# Patient Record
Sex: FEMALE | Race: WHITE | Employment: OTHER | URBAN - METROPOLITAN AREA
[De-identification: names, ages, dates, MRNs, and addresses within clinical notes are randomized per-mention and may not be internally consistent; named-entity substitution may affect disease eponyms.]

---

## 2017-01-13 ENCOUNTER — OFFICE VISIT (OUTPATIENT)
Dept: FAMILY MEDICINE CLINIC | Age: 67
End: 2017-01-13

## 2017-01-13 VITALS
OXYGEN SATURATION: 98 % | WEIGHT: 155 LBS | HEART RATE: 77 BPM | BODY MASS INDEX: 24.91 KG/M2 | HEIGHT: 66 IN | SYSTOLIC BLOOD PRESSURE: 120 MMHG | DIASTOLIC BLOOD PRESSURE: 70 MMHG

## 2017-01-13 DIAGNOSIS — K12.0 CANKER SORE: Primary | ICD-10-CM

## 2017-01-13 PROCEDURE — 99213 OFFICE O/P EST LOW 20 MIN: CPT | Performed by: FAMILY MEDICINE

## 2017-01-13 RX ORDER — METHYLPREDNISOLONE 4 MG/1
TABLET ORAL
Qty: 1 KIT | Refills: 0 | Status: SHIPPED | OUTPATIENT
Start: 2017-01-13 | End: 2017-01-19

## 2017-01-16 ASSESSMENT — ENCOUNTER SYMPTOMS
ABDOMINAL PAIN: 0
SHORTNESS OF BREATH: 0
NAUSEA: 0
VOMITING: 0

## 2017-05-08 ENCOUNTER — TELEPHONE (OUTPATIENT)
Dept: FAMILY MEDICINE CLINIC | Age: 67
End: 2017-05-08

## 2017-05-25 ENCOUNTER — TELEPHONE (OUTPATIENT)
Dept: FAMILY MEDICINE CLINIC | Age: 67
End: 2017-05-25

## 2017-05-30 ENCOUNTER — OFFICE VISIT (OUTPATIENT)
Dept: FAMILY MEDICINE CLINIC | Age: 67
End: 2017-05-30

## 2017-05-30 ENCOUNTER — TELEPHONE (OUTPATIENT)
Dept: FAMILY MEDICINE CLINIC | Age: 67
End: 2017-05-30

## 2017-05-30 VITALS
HEART RATE: 64 BPM | OXYGEN SATURATION: 98 % | TEMPERATURE: 98.2 F | SYSTOLIC BLOOD PRESSURE: 122 MMHG | DIASTOLIC BLOOD PRESSURE: 78 MMHG | WEIGHT: 151 LBS | BODY MASS INDEX: 24.37 KG/M2

## 2017-05-30 DIAGNOSIS — G43.809 OTHER MIGRAINE WITHOUT STATUS MIGRAINOSUS, NOT INTRACTABLE: ICD-10-CM

## 2017-05-30 DIAGNOSIS — J32.9 SINUSITIS, UNSPECIFIED CHRONICITY, UNSPECIFIED LOCATION: Primary | ICD-10-CM

## 2017-05-30 PROCEDURE — 99214 OFFICE O/P EST MOD 30 MIN: CPT | Performed by: FAMILY MEDICINE

## 2017-05-30 RX ORDER — PROPRANOLOL HCL 60 MG
60 CAPSULE, EXTENDED RELEASE 24HR ORAL DAILY
Qty: 30 CAPSULE | Refills: 3 | Status: SHIPPED | OUTPATIENT
Start: 2017-05-30 | End: 2017-07-10

## 2017-05-30 RX ORDER — DOXYCYCLINE HYCLATE 100 MG
100 TABLET ORAL 2 TIMES DAILY
Qty: 20 TABLET | Refills: 0 | Status: SHIPPED | OUTPATIENT
Start: 2017-05-30 | End: 2017-06-09

## 2017-05-30 RX ORDER — PROMETHAZINE HYDROCHLORIDE 25 MG/1
25 TABLET ORAL EVERY 8 HOURS PRN
Qty: 20 TABLET | Refills: 0 | Status: SHIPPED | OUTPATIENT
Start: 2017-05-30 | End: 2017-06-06

## 2017-05-30 RX ORDER — DEXTROMETHORPHAN HYDROBROMIDE AND PROMETHAZINE HYDROCHLORIDE 15; 6.25 MG/5ML; MG/5ML
5 SYRUP ORAL 4 TIMES DAILY PRN
Qty: 240 ML | Refills: 0 | Status: SHIPPED | OUTPATIENT
Start: 2017-05-30 | End: 2017-06-06

## 2017-05-30 ASSESSMENT — PATIENT HEALTH QUESTIONNAIRE - PHQ9
2. FEELING DOWN, DEPRESSED OR HOPELESS: 1
1. LITTLE INTEREST OR PLEASURE IN DOING THINGS: 0
SUM OF ALL RESPONSES TO PHQ9 QUESTIONS 1 & 2: 1
SUM OF ALL RESPONSES TO PHQ QUESTIONS 1-9: 1

## 2017-05-30 ASSESSMENT — ENCOUNTER SYMPTOMS
SORE THROAT: 1
SHORTNESS OF BREATH: 1
COUGH: 1
HEMOPTYSIS: 0
RHINORRHEA: 1

## 2017-06-05 ENCOUNTER — TELEPHONE (OUTPATIENT)
Dept: FAMILY MEDICINE CLINIC | Age: 67
End: 2017-06-05

## 2017-07-10 ENCOUNTER — HOSPITAL ENCOUNTER (OUTPATIENT)
Dept: SURGERY | Age: 67
Discharge: HOME | End: 2017-07-10
Admitting: INTERNAL MEDICINE

## 2017-07-10 VITALS
WEIGHT: 146 LBS | HEIGHT: 66 IN | DIASTOLIC BLOOD PRESSURE: 61 MMHG | RESPIRATION RATE: 16 BRPM | TEMPERATURE: 98.8 F | BODY MASS INDEX: 23.46 KG/M2 | OXYGEN SATURATION: 100 % | HEART RATE: 67 BPM | SYSTOLIC BLOOD PRESSURE: 128 MMHG

## 2017-07-10 RX ORDER — SODIUM CHLORIDE, SODIUM LACTATE, POTASSIUM CHLORIDE, CALCIUM CHLORIDE 600; 310; 30; 20 MG/100ML; MG/100ML; MG/100ML; MG/100ML
INJECTION, SOLUTION INTRAVENOUS ONCE
Status: COMPLETED | OUTPATIENT
Start: 2017-07-10 | End: 2017-07-10

## 2017-07-10 RX ORDER — LIDOCAINE HYDROCHLORIDE 10 MG/ML
0.1 INJECTION, SOLUTION EPIDURAL; INFILTRATION; INTRACAUDAL; PERINEURAL
Status: ACTIVE | OUTPATIENT
Start: 2017-07-10 | End: 2017-07-10

## 2017-07-10 RX ADMIN — SODIUM CHLORIDE, SODIUM LACTATE, POTASSIUM CHLORIDE, CALCIUM CHLORIDE: 600; 310; 30; 20 INJECTION, SOLUTION INTRAVENOUS at 09:40

## 2017-07-10 ASSESSMENT — PAIN SCALES - GENERAL
PAINLEVEL_OUTOF10: 0

## 2017-07-10 ASSESSMENT — PAIN - FUNCTIONAL ASSESSMENT: PAIN_FUNCTIONAL_ASSESSMENT: 0-10

## 2017-11-03 ENCOUNTER — OFFICE VISIT (OUTPATIENT)
Dept: FAMILY MEDICINE CLINIC | Age: 67
End: 2017-11-03

## 2017-11-03 VITALS
HEART RATE: 71 BPM | BODY MASS INDEX: 24.69 KG/M2 | WEIGHT: 153 LBS | SYSTOLIC BLOOD PRESSURE: 126 MMHG | DIASTOLIC BLOOD PRESSURE: 86 MMHG | OXYGEN SATURATION: 98 %

## 2017-11-03 DIAGNOSIS — M79.642 LEFT HAND PAIN: ICD-10-CM

## 2017-11-03 DIAGNOSIS — Z23 NEED FOR INFLUENZA VACCINATION: ICD-10-CM

## 2017-11-03 DIAGNOSIS — R09.81 NASAL CONGESTION: ICD-10-CM

## 2017-11-03 DIAGNOSIS — E78.2 MIXED HYPERLIPIDEMIA: Primary | ICD-10-CM

## 2017-11-03 PROCEDURE — 36415 COLL VENOUS BLD VENIPUNCTURE: CPT | Performed by: FAMILY MEDICINE

## 2017-11-03 PROCEDURE — 90630 INFLUENZA, QUADV, 18-64 YRS, ID, PF, MICRO INJ, 0.1ML (FLUZONE QUADV, PF): CPT | Performed by: FAMILY MEDICINE

## 2017-11-03 PROCEDURE — 90471 IMMUNIZATION ADMIN: CPT | Performed by: FAMILY MEDICINE

## 2017-11-03 PROCEDURE — 99214 OFFICE O/P EST MOD 30 MIN: CPT | Performed by: FAMILY MEDICINE

## 2017-11-03 RX ORDER — SUCRALFATE 1 G/1
TABLET ORAL
COMMUNITY
Start: 2017-09-05

## 2017-11-03 RX ORDER — RANITIDINE 150 MG/1
CAPSULE ORAL
COMMUNITY
Start: 2017-10-30 | End: 2018-08-30

## 2017-11-03 NOTE — PROGRESS NOTES
Subjective:      Patient ID: Paige Johnson is a 77 y.o. female. HPI nasal congestion - for past 2 weeks. Not other allergic rhinitis symptoms except  Occasional sneeze. Nose very dry, occasionally bleeds. Zyrtec too drying. Drinks water, uses Flax oil to help w/ dryness. L hand pain - fell at work about 3 weeks ago, sprained ankle. No pain then. About 2 weeks ago, fell due to dog. Next day had localized discomfort along lateral hand, painful w/ twisting jars open. No swell. Progressively worse, elysia if not using brace. Using wrist brace, which offers some relief. Aleve 2 BID - some relief. Myofascial pain - symptoms wax and wane for 4 years. Feels like pain is under better control vs a few weeks ago. seen 2016 by Dr. Omero cahvez w/o relief. Never seen again for f/u. Is under a lot of stress b/c 's dx of cancer  Borderline LDL - needs recheck cholesterol. Eats lots of fruit, veggie. Is active. Does eat a lot of carbohydrates. Review of Systems  Per HPI    Objective:   Physical Exam   Constitutional: She is oriented to person, place, and time. She appears well-developed and well-nourished. HENT:   Head: Normocephalic and atraumatic. Eyes: Conjunctivae are normal. No scleral icterus. Neck: Neck supple. Carotid bruit is not present. No thyromegaly present. Cardiovascular: Normal rate, regular rhythm and normal heart sounds. Pulmonary/Chest: Effort normal and breath sounds normal.   Musculoskeletal: She exhibits no edema. Left hand: She exhibits tenderness. She exhibits no deformity and no swelling (no mass). Hands:  Lymphadenopathy:     She has no cervical adenopathy. Neurological: She is alert and oriented to person, place, and time. Skin: Skin is warm and dry. Psychiatric: She has a normal mood and affect. Her behavior is normal. Judgment and thought content normal.   Nursing note and vitals reviewed. Assessment:      1.  Mixed

## 2017-11-07 ENCOUNTER — OFFICE VISIT (OUTPATIENT)
Dept: ORTHOPEDIC SURGERY | Age: 67
End: 2017-11-07

## 2017-11-07 VITALS — BODY MASS INDEX: 23.78 KG/M2 | HEIGHT: 66 IN | WEIGHT: 148 LBS

## 2017-11-07 DIAGNOSIS — S62.145A CLOSED NONDISPLACED FRACTURE OF BODY OF HAMATE OF LEFT WRIST, INITIAL ENCOUNTER: ICD-10-CM

## 2017-11-07 DIAGNOSIS — M79.642 LEFT HAND PAIN: Primary | ICD-10-CM

## 2017-11-07 PROCEDURE — L3908 WHO COCK-UP NONMOLDE PRE OTS: HCPCS | Performed by: ORTHOPAEDIC SURGERY

## 2017-11-07 PROCEDURE — 73130 X-RAY EXAM OF HAND: CPT | Performed by: ORTHOPAEDIC SURGERY

## 2017-11-07 PROCEDURE — 99243 OFF/OP CNSLTJ NEW/EST LOW 30: CPT | Performed by: ORTHOPAEDIC SURGERY

## 2017-11-07 NOTE — PROGRESS NOTES
under the direct supervision of the treating physician. Verbal and written instructions for the use of and application of this item were provided. They were instructed to contact the office immediately should the brace result in increased pain, decreased sensation, increased swelling or worsening of the condition. Treatment Plan:  I do believe this injury can be treated with conservative means. We will go ahead and supply a more comfortable and supportive removable left wrist brace. This can come off for hand washing and bathing and simple tasks. I will see her back just after Thanksgiving for repeat radiographs of the hand to make sure she is coming along nicely. She is still having significant issues advanced imaging can always be considered. Please note that this transcription was created using voice recognition software. Any errors are unintentional and may be due to voice recognition transcription.

## 2017-11-27 ENCOUNTER — TELEPHONE (OUTPATIENT)
Dept: FAMILY MEDICINE CLINIC | Age: 67
End: 2017-11-27

## 2017-11-27 DIAGNOSIS — Z00.00 PREVENTATIVE HEALTH CARE: Primary | ICD-10-CM

## 2017-11-27 NOTE — TELEPHONE ENCOUNTER
Patient is calling to speak to Dr. Akhtar Ped nurse. Stated she had to cancel her appointment for the blood test but wanted to make sure she could still get it. Also stated she wanted an order for a Hep B test. Patient stated she has a lot of toenails that have fungus on them and one is ingrown and painful, is asking if she can get a prescription for lamisil and also a prescription for relpax.

## 2017-11-28 RX ORDER — ELETRIPTAN HYDROBROMIDE 40 MG/1
40 TABLET, FILM COATED ORAL
Qty: 9 TABLET | Refills: 3 | Status: SHIPPED | OUTPATIENT
Start: 2017-11-28 | End: 2017-11-28

## 2017-11-28 RX ORDER — ELETRIPTAN HYDROBROMIDE 40 MG/1
40 TABLET, FILM COATED ORAL
Qty: 9 TABLET | Refills: 3 | Status: SHIPPED | OUTPATIENT
Start: 2017-11-28 | End: 2018-08-30

## 2017-11-28 NOTE — TELEPHONE ENCOUNTER
Patient called and is requesting a refill on the Relpax. Patient states that she has migraine headaches and this is the only one of the migraine medications that she can take. Please call patient when rx is sent to [address] [place] [address] in [place]. [place].     Thanks

## 2017-11-29 DIAGNOSIS — Z00.00 PREVENTATIVE HEALTH CARE: ICD-10-CM

## 2017-11-29 LAB
CHOLESTEROL, TOTAL: 225 MG/DL (ref 0–199)
HBV SURFACE AB TITR SER: <3.5 MUL/ML
HDLC SERPL-MCNC: 51 MG/DL (ref 40–60)
LDL CHOLESTEROL CALCULATED: 131 MG/DL
TRIGL SERPL-MCNC: 213 MG/DL (ref 0–150)
VLDLC SERPL CALC-MCNC: 43 MG/DL

## 2018-02-16 ENCOUNTER — OFFICE VISIT (OUTPATIENT)
Dept: FAMILY MEDICINE CLINIC | Age: 68
End: 2018-02-16

## 2018-02-16 VITALS
SYSTOLIC BLOOD PRESSURE: 127 MMHG | BODY MASS INDEX: 24.43 KG/M2 | TEMPERATURE: 97.3 F | OXYGEN SATURATION: 100 % | DIASTOLIC BLOOD PRESSURE: 66 MMHG | WEIGHT: 152 LBS | HEART RATE: 67 BPM | HEIGHT: 66 IN

## 2018-02-16 DIAGNOSIS — J40 BRONCHITIS: Primary | ICD-10-CM

## 2018-02-16 PROCEDURE — 99213 OFFICE O/P EST LOW 20 MIN: CPT | Performed by: FAMILY MEDICINE

## 2018-02-16 RX ORDER — BENZONATATE 100 MG/1
200 CAPSULE ORAL 3 TIMES DAILY PRN
Qty: 40 CAPSULE | Refills: 0 | Status: SHIPPED | OUTPATIENT
Start: 2018-02-16 | End: 2018-10-01 | Stop reason: SDUPTHER

## 2018-02-16 RX ORDER — AZITHROMYCIN 250 MG/1
TABLET, FILM COATED ORAL
Qty: 1 PACKET | Refills: 0 | Status: SHIPPED | OUTPATIENT
Start: 2018-02-16 | End: 2018-02-26

## 2018-02-16 ASSESSMENT — ENCOUNTER SYMPTOMS
SORE THROAT: 0
RHINORRHEA: 0
WHEEZING: 0
COUGH: 1
SHORTNESS OF BREATH: 1

## 2018-02-16 NOTE — PROGRESS NOTES
Subjective:      Patient ID: Re Belcher is a 79 y.o. female. Cough   This is a new problem. The current episode started 1 to 4 weeks ago (2). The problem has been gradually worsening. Associated symptoms include headaches (in AM due to fatigue of insomnia due to cough), nasal congestion and shortness of breath. Pertinent negatives include no ear congestion, fever, myalgias, rhinorrhea, sore throat or wheezing. Nothing aggravates the symptoms. Treatments tried: mucinex,        Review of Systems   Constitutional: Negative for fever. HENT: Negative for rhinorrhea and sore throat. Respiratory: Positive for cough and shortness of breath. Negative for wheezing. Musculoskeletal: Negative for myalgias. Neurological: Positive for headaches (in AM due to fatigue of insomnia due to cough). Objective:   Physical Exam   Constitutional: She is oriented to person, place, and time. She appears well-developed and well-nourished. HENT:   Head: Normocephalic and atraumatic. Right Ear: Tympanic membrane, external ear and ear canal normal.   Left Ear: Tympanic membrane, external ear and ear canal normal.   Nose: No rhinorrhea or nasal deformity. Mouth/Throat: Uvula is midline, oropharynx is clear and moist and mucous membranes are normal.   Eyes: Conjunctivae are normal. Right eye exhibits no discharge. Left eye exhibits no discharge. No scleral icterus. Neck: Neck supple. No tracheal deviation present. No thyromegaly present. Cardiovascular: Normal rate, regular rhythm and normal heart sounds. Pulmonary/Chest: Effort normal and breath sounds normal.   Lymphadenopathy:     She has no cervical adenopathy. Neurological: She is alert and oriented to person, place, and time. Skin: Skin is warm and dry. Psychiatric: She has a normal mood and affect. Her behavior is normal. Judgment and thought content normal.   Nursing note and vitals reviewed. Assessment:      1.  Bronchitis  azithromycin

## 2018-02-21 ENCOUNTER — TELEPHONE (OUTPATIENT)
Dept: FAMILY MEDICINE CLINIC | Age: 68
End: 2018-02-21

## 2018-02-22 RX ORDER — PREDNISONE 20 MG/1
TABLET ORAL
Qty: 10 TABLET | Refills: 0 | Status: SHIPPED | OUTPATIENT
Start: 2018-02-22 | End: 2018-03-06 | Stop reason: CLARIF

## 2018-02-28 ENCOUNTER — TELEPHONE (OUTPATIENT)
Dept: FAMILY MEDICINE CLINIC | Age: 68
End: 2018-02-28

## 2018-02-28 NOTE — TELEPHONE ENCOUNTER
Patient called to let Catee know that the medication that she prescribed for her worked well. Patient states that she finished the prescription.

## 2018-03-05 ENCOUNTER — TELEPHONE (OUTPATIENT)
Dept: FAMILY MEDICINE CLINIC | Age: 68
End: 2018-03-05

## 2018-03-06 ENCOUNTER — OFFICE VISIT (OUTPATIENT)
Dept: FAMILY MEDICINE CLINIC | Age: 68
End: 2018-03-06

## 2018-03-06 VITALS
TEMPERATURE: 98 F | SYSTOLIC BLOOD PRESSURE: 122 MMHG | HEIGHT: 66 IN | DIASTOLIC BLOOD PRESSURE: 64 MMHG | OXYGEN SATURATION: 98 % | RESPIRATION RATE: 16 BRPM | HEART RATE: 76 BPM | BODY MASS INDEX: 24.59 KG/M2 | WEIGHT: 153 LBS

## 2018-03-06 DIAGNOSIS — R05.9 COUGH: Primary | ICD-10-CM

## 2018-03-06 DIAGNOSIS — R06.02 SHORTNESS OF BREATH: ICD-10-CM

## 2018-03-06 PROCEDURE — 99213 OFFICE O/P EST LOW 20 MIN: CPT | Performed by: FAMILY MEDICINE

## 2018-03-06 RX ORDER — ALBUTEROL SULFATE 90 UG/1
2 AEROSOL, METERED RESPIRATORY (INHALATION) EVERY 6 HOURS PRN
Qty: 1 INHALER | Refills: 1 | Status: SHIPPED | OUTPATIENT
Start: 2018-03-06

## 2018-03-06 ASSESSMENT — ENCOUNTER SYMPTOMS
WHEEZING: 1
COUGH: 1

## 2018-03-06 NOTE — PROGRESS NOTES
Subjective:      Patient ID: Herbert Turner is a 79 y.o. female. Other   This is a new problem. The current episode started 1 to 4 weeks ago (4). The problem has been waxing and waning (improved slightly after course abx, but has returned. ). Associated symptoms include coughing and fatigue. Pertinent negatives include no fever (elysia by night's end). Associated symptoms comments: Raspy voice as day progresses and increased cough, shortness of breath/chest tightness as day progresses; wakes feeling fairly well. Cold air causes chest tightness. Had audible wheezing over weekend. . Exacerbated by: talking, cold air, day's progression. Treatments tried: naproxen and zyrtec w/o relief. Cough   Associated symptoms include wheezing. Pertinent negatives include no fever (elysia by night's end). Wheezing    Associated symptoms include coughing. Pertinent negatives include no fever (elysia by night's end). Review of Systems   Constitutional: Positive for fatigue. Negative for fever (elysia by night's end). Respiratory: Positive for cough and wheezing. Objective:   Physical Exam   Constitutional: She is oriented to person, place, and time. She appears well-developed and well-nourished. HENT:   Head: Normocephalic and atraumatic. Right Ear: Tympanic membrane, external ear and ear canal normal.   Left Ear: Tympanic membrane, external ear and ear canal normal.   Nose: No rhinorrhea or nasal deformity. Mouth/Throat: Uvula is midline, oropharynx is clear and moist and mucous membranes are normal.   Eyes: Conjunctivae are normal. Right eye exhibits no discharge. Left eye exhibits no discharge. No scleral icterus. Neck: Neck supple. No tracheal deviation present. No thyromegaly present. Cardiovascular: Normal rate, regular rhythm and normal heart sounds. Pulmonary/Chest: Effort normal and breath sounds normal.   Lymphadenopathy:     She has no cervical adenopathy.    Neurological: She is alert and

## 2018-07-03 ENCOUNTER — OFFICE VISIT (OUTPATIENT)
Dept: FAMILY MEDICINE CLINIC | Age: 68
End: 2018-07-03

## 2018-07-03 VITALS
HEART RATE: 84 BPM | DIASTOLIC BLOOD PRESSURE: 68 MMHG | TEMPERATURE: 97.6 F | WEIGHT: 152 LBS | SYSTOLIC BLOOD PRESSURE: 132 MMHG | BODY MASS INDEX: 24.53 KG/M2

## 2018-07-03 DIAGNOSIS — G47.30 SLEEP APNEA, UNSPECIFIED TYPE: ICD-10-CM

## 2018-07-03 DIAGNOSIS — G89.29 CHRONIC NONINTRACTABLE HEADACHE, UNSPECIFIED HEADACHE TYPE: Primary | ICD-10-CM

## 2018-07-03 DIAGNOSIS — R42 DIZZINESS: ICD-10-CM

## 2018-07-03 DIAGNOSIS — R51.9 CHRONIC NONINTRACTABLE HEADACHE, UNSPECIFIED HEADACHE TYPE: Primary | ICD-10-CM

## 2018-07-03 DIAGNOSIS — R06.83 SNORING: ICD-10-CM

## 2018-07-03 PROCEDURE — 99214 OFFICE O/P EST MOD 30 MIN: CPT | Performed by: NURSE PRACTITIONER

## 2018-07-03 RX ORDER — PROPRANOLOL HYDROCHLORIDE 80 MG/1
80 CAPSULE, EXTENDED RELEASE ORAL DAILY
Qty: 30 CAPSULE | Refills: 3 | Status: CANCELLED | OUTPATIENT
Start: 2018-07-03

## 2018-07-03 RX ORDER — LORATADINE 10 MG/1
10 CAPSULE, LIQUID FILLED ORAL DAILY
COMMUNITY
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
## 2018-07-05 ENCOUNTER — TELEPHONE (OUTPATIENT)
Dept: FAMILY MEDICINE CLINIC | Age: 68
End: 2018-07-05

## 2018-07-05 DIAGNOSIS — R42 VERTIGO: ICD-10-CM

## 2018-07-05 RX ORDER — MECLIZINE HYDROCHLORIDE 25 MG/1
TABLET ORAL
Qty: 40 TABLET | Refills: 1 | Status: SHIPPED | OUTPATIENT
Start: 2018-07-05
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
## 2018-08-30 ENCOUNTER — OFFICE VISIT (OUTPATIENT)
Dept: FAMILY MEDICINE CLINIC | Age: 68
End: 2018-08-30

## 2018-08-30 VITALS
SYSTOLIC BLOOD PRESSURE: 115 MMHG | WEIGHT: 150.6 LBS | HEART RATE: 91 BPM | BODY MASS INDEX: 24.31 KG/M2 | OXYGEN SATURATION: 96 % | DIASTOLIC BLOOD PRESSURE: 68 MMHG

## 2018-08-30 DIAGNOSIS — Z23 NEED FOR VACCINATION: ICD-10-CM

## 2018-08-30 DIAGNOSIS — B02.23 POST-HERPETIC POLYNEUROPATHY: Primary | ICD-10-CM

## 2018-08-30 PROCEDURE — 99214 OFFICE O/P EST MOD 30 MIN: CPT | Performed by: NURSE PRACTITIONER

## 2018-08-30 PROCEDURE — 90670 PCV13 VACCINE IM: CPT | Performed by: NURSE PRACTITIONER

## 2018-08-30 PROCEDURE — 90471 IMMUNIZATION ADMIN: CPT | Performed by: NURSE PRACTITIONER

## 2018-08-30 RX ORDER — AMITRIPTYLINE HYDROCHLORIDE 25 MG/1
1 TABLET, FILM COATED ORAL NIGHTLY PRN
Refills: 0 | COMMUNITY
Start: 2018-08-23

## 2018-08-30 RX ORDER — VALACYCLOVIR HYDROCHLORIDE 1 G/1
1 TABLET, FILM COATED ORAL 3 TIMES DAILY
Refills: 0 | COMMUNITY
Start: 2018-08-23

## 2018-08-30 ASSESSMENT — PATIENT HEALTH QUESTIONNAIRE - PHQ9
SUM OF ALL RESPONSES TO PHQ QUESTIONS 1-9: 0
2. FEELING DOWN, DEPRESSED OR HOPELESS: 0
1. LITTLE INTEREST OR PLEASURE IN DOING THINGS: 0
SUM OF ALL RESPONSES TO PHQ9 QUESTIONS 1 & 2: 0
SUM OF ALL RESPONSES TO PHQ QUESTIONS 1-9: 0

## 2018-08-30 NOTE — PATIENT INSTRUCTIONS
unusual results with certain medical tests. Tell any doctor who treats you that you are using gabapentin. Store gabapentin tablets and capsules at room temperature away from light and moisture. Store the liquid medicine in the refrigerator. Do not freeze. What happens if I miss a dose? Take the missed dose as soon as you remember. Be sure to take the medicine with food. Skip the missed dose if it is almost time for your next scheduled dose. Do not take extra medicine to make up the missed dose. What happens if I overdose? Seek emergency medical attention or call the Poison Help line at 1-292.849.2432. What should I avoid while taking gabapentin? This medicine may impair your thinking or reactions. Be careful if you drive or do anything that requires you to be alert. Avoid taking an antacid within 2 hours before or after you take gabapentin. Antacids can make it harder for your body to absorb gabapentin. Drinking alcohol with this medicine can cause side effects. What are the possible side effects of gabapentin? Get emergency medical help if you have signs of an allergic reaction: hives; difficult breathing; swelling of your face, lips, tongue, or throat. Seek medical treatment if you have a serious drug reaction that can affect many parts of your body. Symptoms may include: skin rash, fever, swollen glands, flu-like symptoms, muscle aches, severe weakness, unusual bruising, or yellowing of your skin or eyes. This reaction may occur several weeks after you began using gabapentin. Report any new or worsening symptoms to your doctor, such as: mood or behavior changes, anxiety, panic attacks, trouble sleeping, or if you feel impulsive, irritable, agitated, hostile, aggressive, restless, hyperactive (mentally or physically), depressed, or have thoughts about suicide or hurting yourself.   Call your doctor at once if you have:  · increased seizures;  · severe weakness or tiredness;  · problems with balance or

## 2018-08-30 NOTE — PROGRESS NOTES
Subjective:      Patient ID: Juanpablo Rondon is a 79 y.o. female. HPI Pt is here with shingles. Pt is in a lot of pain. Pt was diagnosed at urgent care with shingles. Pt does have plenty of Vicodin at home which helps with the pain. 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 Review of Systems   Constitutional: Negative for chills, fatigue and fever. Skin: Positive for rash. All other systems reviewed and are negative. Objective:   Physical Exam   Constitutional: She is oriented to person, place, and time. She appears well-developed and well-nourished. Cardiovascular: Normal rate, regular rhythm and normal heart sounds. No murmur heard. Pulmonary/Chest: Effort normal and breath sounds normal. She has no wheezes. She has no rales. Neurological: She is alert and oriented to person, place, and time. Skin: Skin is warm and dry. Rash noted. Linear vesicular  rash. Right side of abdomen. Psychiatric: She has a normal mood and affect. Her behavior is normal. Thought content normal.   Vitals reviewed. Assessment:       Diagnosis Orders   1. Post-herpetic polyneuropathy     2. Need for vaccination  PREVNAR 13 IM (Pneumococcal conjugate vaccine 13-valent)           Plan:      Janee Jacobson was seen today for letter for school/work. Diagnoses and all orders for this visit:    Post-herpetic polyneuropathy = Sec patient does have shingles patient are to medication we'll continue current medication patient continued to have pain will sent in gabapentin for patient okay for patient to be off until next week.     Need for vaccination - patient is due for pneumonia vaccine  -     PREVNAR 13 IM (Pneumococcal conjugate vaccine 13-valent)                HELGA Alejo - CNP

## 2018-09-06 ENCOUNTER — OFFICE VISIT (OUTPATIENT)
Dept: FAMILY MEDICINE CLINIC | Age: 68
End: 2018-09-06

## 2018-09-06 VITALS
SYSTOLIC BLOOD PRESSURE: 124 MMHG | WEIGHT: 151.2 LBS | HEIGHT: 66 IN | DIASTOLIC BLOOD PRESSURE: 72 MMHG | BODY MASS INDEX: 24.3 KG/M2 | OXYGEN SATURATION: 98 % | HEART RATE: 78 BPM

## 2018-09-06 DIAGNOSIS — R23.3 PETECHIAL RASH: Primary | ICD-10-CM

## 2018-09-06 DIAGNOSIS — Z11.59 NEED FOR HEPATITIS C SCREENING TEST: ICD-10-CM

## 2018-09-06 LAB
A/G RATIO: 2 (ref 1.1–2.2)
ALBUMIN SERPL-MCNC: 4.7 G/DL (ref 3.4–5)
ALP BLD-CCNC: 82 U/L (ref 40–129)
ALT SERPL-CCNC: 15 U/L (ref 10–40)
ANION GAP SERPL CALCULATED.3IONS-SCNC: 12 MMOL/L (ref 3–16)
APTT: 35 SEC (ref 26–36)
AST SERPL-CCNC: 20 U/L (ref 15–37)
BASOPHILS ABSOLUTE: 0 K/UL (ref 0–0.2)
BASOPHILS RELATIVE PERCENT: 0.6 %
BILIRUB SERPL-MCNC: 0.3 MG/DL (ref 0–1)
BUN BLDV-MCNC: 15 MG/DL (ref 7–20)
CALCIUM SERPL-MCNC: 10.2 MG/DL (ref 8.3–10.6)
CHLORIDE BLD-SCNC: 103 MMOL/L (ref 99–110)
CO2: 28 MMOL/L (ref 21–32)
CREAT SERPL-MCNC: 0.6 MG/DL (ref 0.6–1.2)
EOSINOPHILS ABSOLUTE: 0.1 K/UL (ref 0–0.6)
EOSINOPHILS RELATIVE PERCENT: 1.5 %
GFR AFRICAN AMERICAN: >60
GFR NON-AFRICAN AMERICAN: >60
GLOBULIN: 2.3 G/DL
GLUCOSE BLD-MCNC: 88 MG/DL (ref 70–99)
HCT VFR BLD CALC: 41.1 % (ref 36–48)
HEMOGLOBIN: 13.8 G/DL (ref 12–16)
HEPATITIS C ANTIBODY INTERPRETATION: NORMAL
INR BLD: 0.97 (ref 0.86–1.14)
LYMPHOCYTES ABSOLUTE: 2.3 K/UL (ref 1–5.1)
LYMPHOCYTES RELATIVE PERCENT: 33.3 %
MCH RBC QN AUTO: 30.7 PG (ref 26–34)
MCHC RBC AUTO-ENTMCNC: 33.6 G/DL (ref 31–36)
MCV RBC AUTO: 91.2 FL (ref 80–100)
MONOCYTES ABSOLUTE: 0.4 K/UL (ref 0–1.3)
MONOCYTES RELATIVE PERCENT: 6.2 %
NEUTROPHILS ABSOLUTE: 4.1 K/UL (ref 1.7–7.7)
NEUTROPHILS RELATIVE PERCENT: 58.4 %
PDW BLD-RTO: 13.1 % (ref 12.4–15.4)
PLATELET # BLD: 221 K/UL (ref 135–450)
PMV BLD AUTO: 8.6 FL (ref 5–10.5)
POTASSIUM SERPL-SCNC: 4.5 MMOL/L (ref 3.5–5.1)
PROTHROMBIN TIME: 11.1 SEC (ref 9.8–13)
RBC # BLD: 4.51 M/UL (ref 4–5.2)
SODIUM BLD-SCNC: 143 MMOL/L (ref 136–145)
TOTAL PROTEIN: 7 G/DL (ref 6.4–8.2)
WBC # BLD: 7 K/UL (ref 4–11)

## 2018-09-06 PROCEDURE — 99214 OFFICE O/P EST MOD 30 MIN: CPT | Performed by: FAMILY MEDICINE

## 2018-09-06 ASSESSMENT — ENCOUNTER SYMPTOMS
SORE THROAT: 0
EYE PAIN: 0
COUGH: 1
SHORTNESS OF BREATH: 0

## 2018-09-25 DIAGNOSIS — J40 BRONCHITIS: ICD-10-CM

## 2018-09-25 RX ORDER — BENZONATATE 100 MG/1
200 CAPSULE ORAL 3 TIMES DAILY PRN
Qty: 40 CAPSULE | Refills: 0 | OUTPATIENT
Start: 2018-09-25 | End: 2018-10-02

## 2018-09-28 ENCOUNTER — TELEPHONE (OUTPATIENT)
Dept: FAMILY MEDICINE CLINIC | Age: 68
End: 2018-09-28

## 2018-10-01 DIAGNOSIS — J40 BRONCHITIS: ICD-10-CM

## 2018-10-01 RX ORDER — BENZONATATE 100 MG/1
200 CAPSULE ORAL 3 TIMES DAILY PRN
Qty: 40 CAPSULE | Refills: 0 | Status: SHIPPED | OUTPATIENT
Start: 2018-10-01 | End: 2018-10-08

## 2019-01-04 ENCOUNTER — OFFICE VISIT (OUTPATIENT)
Dept: FAMILY MEDICINE CLINIC | Age: 69
End: 2019-01-04
Payer: COMMERCIAL

## 2019-01-04 VITALS
SYSTOLIC BLOOD PRESSURE: 132 MMHG | DIASTOLIC BLOOD PRESSURE: 80 MMHG | OXYGEN SATURATION: 97 % | HEART RATE: 68 BPM | BODY MASS INDEX: 24.53 KG/M2 | WEIGHT: 152 LBS

## 2019-01-04 DIAGNOSIS — J39.8 CONGESTION OF UPPER RESPIRATORY TRACT: Primary | ICD-10-CM

## 2019-01-04 PROCEDURE — 99212 OFFICE O/P EST SF 10 MIN: CPT | Performed by: FAMILY MEDICINE

## 2020-02-25 ENCOUNTER — TELEPHONE (OUTPATIENT)
Dept: FAMILY MEDICINE CLINIC | Age: 70
End: 2020-02-25

## 2020-02-25 NOTE — TELEPHONE ENCOUNTER
Will need to have the patient fill out a medical record form, when I got to this message it was after five.

## 2021-02-23 NOTE — TELEPHONE ENCOUNTER
Talked to pt will give short burst of steroids for sob, pt has felt this SOB in the past, will give an update tomorrow or come in and see dr monreal if not better. Airborne precautions.../Droplet+Contact precautions

## 2021-10-08 ENCOUNTER — HOSPITAL ENCOUNTER (EMERGENCY)
Age: 71
Discharge: HOME | End: 2021-10-08
Attending: EMERGENCY MEDICINE
Payer: COMMERCIAL

## 2021-10-08 ENCOUNTER — APPOINTMENT (OUTPATIENT)
Dept: GENERAL RADIOLOGY | Age: 71
End: 2021-10-08
Payer: COMMERCIAL

## 2021-10-08 VITALS
SYSTOLIC BLOOD PRESSURE: 136 MMHG | RESPIRATION RATE: 17 BRPM | TEMPERATURE: 97.8 F | HEART RATE: 60 BPM | DIASTOLIC BLOOD PRESSURE: 65 MMHG | OXYGEN SATURATION: 99 %

## 2021-10-08 DIAGNOSIS — R07.9 CHEST PAIN, UNSPECIFIED TYPE: Primary | ICD-10-CM

## 2021-10-08 DIAGNOSIS — R42 VERTIGO: ICD-10-CM

## 2021-10-08 LAB
ANION GAP SERPL CALCULATED.3IONS-SCNC: 11 MMOL/L (ref 3–16)
BASOPHILS ABSOLUTE: 0.1 K/UL (ref 0–0.2)
BASOPHILS RELATIVE PERCENT: 0.9 %
BUN BLDV-MCNC: 15 MG/DL (ref 7–20)
CALCIUM SERPL-MCNC: 9.3 MG/DL (ref 8.3–10.6)
CHLORIDE BLD-SCNC: 106 MMOL/L (ref 99–110)
CO2: 23 MMOL/L (ref 21–32)
CREAT SERPL-MCNC: 0.6 MG/DL (ref 0.6–1.2)
D DIMER: <200 NG/ML DDU (ref 0–229)
EKG ATRIAL RATE: 72 BPM
EKG DIAGNOSIS: NORMAL
EKG P AXIS: 61 DEGREES
EKG P-R INTERVAL: 194 MS
EKG Q-T INTERVAL: 404 MS
EKG QRS DURATION: 82 MS
EKG QTC CALCULATION (BAZETT): 442 MS
EKG R AXIS: 10 DEGREES
EKG T AXIS: 56 DEGREES
EKG VENTRICULAR RATE: 72 BPM
EOSINOPHILS ABSOLUTE: 0.1 K/UL (ref 0–0.6)
EOSINOPHILS RELATIVE PERCENT: 1.3 %
GFR AFRICAN AMERICAN: >60
GFR NON-AFRICAN AMERICAN: >60
GLUCOSE BLD-MCNC: 88 MG/DL (ref 70–99)
HCT VFR BLD CALC: 36 % (ref 36–48)
HEMOGLOBIN: 12.5 G/DL (ref 12–16)
LYMPHOCYTES ABSOLUTE: 3.1 K/UL (ref 1–5.1)
LYMPHOCYTES RELATIVE PERCENT: 43.5 %
MCH RBC QN AUTO: 30.7 PG (ref 26–34)
MCHC RBC AUTO-ENTMCNC: 34.7 G/DL (ref 31–36)
MCV RBC AUTO: 88.5 FL (ref 80–100)
MONOCYTES ABSOLUTE: 0.6 K/UL (ref 0–1.3)
MONOCYTES RELATIVE PERCENT: 7.8 %
NEUTROPHILS ABSOLUTE: 3.3 K/UL (ref 1.7–7.7)
NEUTROPHILS RELATIVE PERCENT: 46.5 %
PDW BLD-RTO: 12.9 % (ref 12.4–15.4)
PLATELET # BLD: 204 K/UL (ref 135–450)
PMV BLD AUTO: 7.9 FL (ref 5–10.5)
POTASSIUM SERPL-SCNC: 3.6 MMOL/L (ref 3.5–5.1)
RBC # BLD: 4.07 M/UL (ref 4–5.2)
SODIUM BLD-SCNC: 140 MMOL/L (ref 136–145)
TROPONIN: <0.01 NG/ML
TROPONIN: <0.01 NG/ML
WBC # BLD: 7.1 K/UL (ref 4–11)

## 2021-10-08 PROCEDURE — 80048 BASIC METABOLIC PNL TOTAL CA: CPT

## 2021-10-08 PROCEDURE — 93010 ELECTROCARDIOGRAM REPORT: CPT | Performed by: INTERNAL MEDICINE

## 2021-10-08 PROCEDURE — 6370000000 HC RX 637 (ALT 250 FOR IP): Performed by: EMERGENCY MEDICINE

## 2021-10-08 PROCEDURE — 85379 FIBRIN DEGRADATION QUANT: CPT

## 2021-10-08 PROCEDURE — 93005 ELECTROCARDIOGRAM TRACING: CPT | Performed by: EMERGENCY MEDICINE

## 2021-10-08 PROCEDURE — 85025 COMPLETE CBC W/AUTO DIFF WBC: CPT

## 2021-10-08 PROCEDURE — 36415 COLL VENOUS BLD VENIPUNCTURE: CPT

## 2021-10-08 PROCEDURE — 71046 X-RAY EXAM CHEST 2 VIEWS: CPT

## 2021-10-08 PROCEDURE — 99284 EMERGENCY DEPT VISIT MOD MDM: CPT

## 2021-10-08 PROCEDURE — 99283 EMERGENCY DEPT VISIT LOW MDM: CPT | Performed by: INTERNAL MEDICINE

## 2021-10-08 PROCEDURE — 84484 ASSAY OF TROPONIN QUANT: CPT

## 2021-10-08 RX ORDER — MECLIZINE HCL 12.5 MG/1
25 TABLET ORAL ONCE
Status: COMPLETED | OUTPATIENT
Start: 2021-10-08 | End: 2021-10-08

## 2021-10-08 RX ORDER — MECLIZINE HYDROCHLORIDE 25 MG/1
25 TABLET ORAL 3 TIMES DAILY PRN
Qty: 15 TABLET | Refills: 0 | Status: SHIPPED | OUTPATIENT
Start: 2021-10-08 | End: 2021-10-18

## 2021-10-08 RX ADMIN — MECLIZINE 25 MG: 12.5 TABLET ORAL at 12:45

## 2021-10-08 NOTE — ED PROVIDER NOTES
[address] [facility]  ED  EMERGENCY DEPARTMENT ENCOUNTER      Pt Name: Greg Knutson  MRN: [id number]  [place] 1950  Date of evaluation: 10/8/2021  Provider: Natalie Martinez MD    CHIEF COMPLAINT       Chief Complaint   Patient presents with    Chest Pain     Received 325mg ASA, 1 nitro SL    Dizziness         HISTORY OF PRESENT ILLNESS   (Location/Symptom, Timing/Onset, Context/Setting, Quality, Duration, Modifying Factors, Severity)  Note limiting factors. Greg Knutson is a 79 y.o. female with past medical history of hyperlipidemia, chronic migraine headaches and vertigo here today with dizziness and chest pain    Patient's presents to the emergency department today for chest pain. States she has an aching squeezing tightness in the center of her chest.  No significant shortness of breath, cough or hemoptysis. No lower extremity swelling. No recent prolonged mobility. Denies any abdominal pain but has had some mild nausea associated with dizziness. She states she has a longstanding history of vertigo that she has had for years, though admits she has not had it in quite some time. She states for the last 3 to 4 days she has had otherwise typical vertiginous symptoms with dizziness worse with looking to the left or turning her head to the left. She has chronic nasal congestion which she states is unchanged. No earache or ear pain. No tinnitus. No numbness, tingling or weakness in her extremities. She states she is prescribed meclizine but her prescription was out of date    [facility]    Nursing Notes were reviewed. REVIEW OF SYSTEMS    (2-9 systems for level 4, 10 or more for level 5)     Review of Systems    Please see HPI for pertinent positive and negative review of system findings. A full 10 system ROS was performed and otherwise negative.         PAST MEDICAL HISTORY     Past Medical History:   Diagnosis Date    Allergic rhinitis     Arthritis     Depression     GERD (gastroesophageal reflux disease)     Hyperlipidemia     Knee pain, right     Lipoma     Dr. Girish Buchanan    Migraine headache     Dr. Georgi Riedel neurology    Nausea & vomiting     post-op    Osteopenia 2016    mild, femurs         SURGICAL HISTORY       Past Surgical History:   Procedure Laterality Date    BUNIONECTOMY       SECTION      twice    COLONOSCOPY      COLONOSCOPY  07/10/2017    KNEE ARTHROSCOPY  [id number]    rt knee    UPPER GASTROINTESTINAL ENDOSCOPY  07/10/2017    bx         CURRENT MEDICATIONS       Previous Medications    ALBUTEROL SULFATE HFA (PROAIR HFA) 108 (90 BASE) MCG/ACT INHALER    Inhale 2 puffs into the lungs every 6 hours as needed for Wheezing    AMITRIPTYLINE (ELAVIL) 25 MG TABLET    Take 1 tablet by mouth nightly as needed    ASPIRIN 81 MG EC TABLET    Take 81 mg by mouth daily. ELETRIPTAN (RELPAX) 40 MG TABLET    Take 1 tablet by mouth once as needed (migraine) may repeat in 2 hours if necessary. Maximum of 2 doses in a 24 hour period. FLAXSEED, LINSEED, (FLAX SEED OIL) 1000 MG CAPS    Take 1 capsule by mouth daily. GABAPENTIN (NEURONTIN) 100 MG CAPSULE    1 tablet at night for 2 weeks, then increase to 2 tablets at night. [employer] LORATADINE (CLARITIN) 10 MG CAPSULE    Take 10 mg by mouth daily    MECLIZINE (ANTIVERT) 25 MG TABLET    1 po q 6 hrs prn    MULTIPLE VITAMIN (MULTIVITAMINS PO)    Take by mouth    PROBIOTIC PRODUCT (PROBIOTIC DAILY PO)    Take  by mouth.     SUCRALFATE (CARAFATE) 1 GM TABLET        VALACYCLOVIR (VALTREX) 1 G TABLET    Take 1 tablet by mouth 3 times daily       ALLERGIES     Levofloxacin, Omnicef, Acyclovir and related, Erythromycin, Lipitor, Topamax, Diclofenac, and Penicillins    FAMILY HISTORY       Family History   Problem Relation Age of Onset    Heart Failure Mother     Heart Disease Mother         CHF   Bambi.Tasneem Migraines Mother     High Blood Pressure Mother     Depression Mother     Anxiety Disorder Mother     Coronary Art Dis Father     Heart Disease Father         MI 55    Dementia Father     High Cholesterol Father     Stroke Father     Migraines Other     High Blood Pressure Other     Migraines Sister     Migraines Brother     Heart Disease Maternal Aunt     Dementia Maternal Uncle     Diabetes Maternal Uncle     Diabetes Paternal Uncle     Dementia Maternal Grandfather     Heart Disease Maternal Grandfather     Heart Disease Paternal Grandmother     Diabetes Paternal Grandfather     Stroke Paternal Grandfather           SOCIAL HISTORY       Social History     Socioeconomic History    Marital status:      Spouse name: Not on file    Number of children: Not on file    Years of education: Not on file    Highest education level: Not on file   Occupational History    Not on file   Tobacco Use    Smoking status: Never Smoker    Smokeless tobacco: Never Used   Substance and Sexual Activity    Alcohol use: Yes     Alcohol/week: 0.0 standard drinks     Comment: rarely     Drug use: No    Sexual activity: Not on file   Other Topics Concern    Not on file   Social History Narrative    Not on file     Social Determinants of Health     Financial Resource Strain:     Difficulty of Paying Living Expenses:    Food Insecurity:     Worried About Running Out of Food in the Last Year:     [address] in the Last Year:    Transportation Needs:     Lack of Transportation (Medical):      Lack of Transportation (Non-Medical):    Physical Activity:     Days of Exercise per Week:     Minutes of Exercise per Session:    Stress:     Feeling of Stress :    Social Connections:     Frequency of Communication with Friends and Family:     Frequency of Social Gatherings with Friends and Family:     Attends Shinto Services:     Active Member of Clubs or Organizations:     Attends Club or Organization Meetings:     Marital Status:    Intimate Partner Violence:     Fear of Current or Ex-Partner:     Emotionally Abused:     Physically Abused:     Sexually Abused:        SCREENINGS    [place]ty Coma Scale  Eye Opening: Spontaneous  Best Verbal Response: Oriented  Best Motor Response: Obeys commands  Jon Coma Scale Score: 15          PHYSICAL EXAM    (up to 7 for level 4, 8 or more for level 5)     ED Triage Vitals   BP Temp Temp Source Pulse Resp SpO2 Height Weight   10/08/21 1223 10/08/21 1212 10/08/21 1212 10/08/21 1223 10/08/21 1223 10/08/21 1223 -- --   132/66 97.8 °F (36.6 °C) Oral 67 16 98 %         Physical Exam    General appearance:  Cooperative. No acute distress. Skin:  Warm. Dry. Eye:  Extraocular movements intact. Pupils are equally round and reactive to light and accommodation. Extraocular motions are intact. CN II-XII intact. Leftward beating nystagmus. Negative test of skew. Ears, nose, mouth and throat:  Oral mucosa moist, tympanic membranes clear bilaterally  Neck:  Trachea midline. Heart:  Regular rate and rhythm  Perfusion:  intact  Respiratory:  Lungs clear to auscultation bilaterally. Respirations nonlabored. Abdominal:   Non distended. Nontender  Neurological:  Alert and oriented x 3. Moves all extremities spontaneously. Intact sensation throughout. Intact finger-to-nose bilaterally. No drift in the upper or lower extremities.   Gait normal.  2+ bilateral patellar reflexes  Musculoskeletal:   Normal ROM, no deformities          Psychiatric:  Normal mood      DIAGNOSTIC RESULTS       Labs Reviewed   BASIC METABOLIC PANEL    Narrative:     Performed at:  [place] [address] [address]   Phone [phone]   TROPONIN    Narrative:     Performed at:  [facility]) [place] [address] [address]   Phone [phone]   CBC WITH AUTO DIFFERENTIAL    Narrative:     Performed at:  [place] [address] [address]   Phone [phone] TROPONIN    Narrative:     Performed at:  [facility]) [facility]  [address] [place] [address],  [place], [address] [place], [place] [address]s [place]   Phone [phone]   D-DIMER, QUANTITATIVE       Interpretation per the Radiologist below, if obtained/available at the time of this note:    XR CHEST (2 VW)   Final Result   No acute process. All other labs/imaging were within normal range or not returned as of this dictation. EMERGENCY DEPARTMENT COURSE and DIFFERENTIAL DIAGNOSIS/MDM:   Vitals:    Vitals:    10/08/21 1212 10/08/21 1220 10/08/21 1223 10/08/21 1320   BP:  132/66 132/66 136/65   Pulse:  70 67 60   Resp:  10 16 17   Temp: 97.8 °F (36.6 °C)      TempSrc: Oral      SpO2:  100% 98% 99%       EKG: Sinus rhythm at a rate of 72 bpm.  No ST elevation or arrhythmia. Patient presents the emergency department today with 2 complaints. She is having vertiginous symptoms but also chest pain. They do not appear to be temporally related, rather separate. She has no focal neurologic symptoms. I have low suspicion for stroke. She has had these symptoms numerous times in the past and is prescribed meclizine. The chest pain however is new. She reports a family history of coronary artery disease and she has a personal history of hyperlipidemia but otherwise no known cardiac disease. She has an unremarkable EKG chest x-ray at present. Initial troponin negative. Discussed possible admission with the patient, however she did admit she would prefer to be discharged home and pursue this as an outpatient, but she was open for admission if necessary. Unfortunately unable to obtain stress test today. Discussed the case with cardiology he did come down and evaluate the patient and requested add on a D-dimer which is pending at this time, but otherwise felt that she likely could be discharged home to have an outpatient stress test with her cardiologist within the next week.  Patient states that this should be easily arranged able and his plan to be discharged home pending results of her D-dimer testing    MDM    CONSULTS     IP CONSULT TO HOSPITALIST  IP CONSULT TO CARDIOLOGY    Critical Care:   None    REASSESSMENT          PROCEDURE     Unless otherwise noted below, none     Procedures      FINAL IMPRESSION      1. Chest pain, unspecified type    2. Vertigo            DISPOSITION/PLAN   DISPOSITION Decision To Discharge 10/08/2021 02:41:52 PM        PATIENT REFERRED TO:  nYes Graf MD  [phone]    Schedule an appointment as soon as possible for a visit         DISCHARGE MEDICATIONS:  New Prescriptions    MECLIZINE (ANTIVERT) 25 MG TABLET    Take 1 tablet by mouth 3 times daily as needed for Dizziness     Controlled Substances Monitoring:     No flowsheet data found.     (Please note that portions of this note were completed with a voice recognition program.  Efforts were made to edit the dictations but occasionally words are mis-transcribed.)    Sarai Ventura MD (electronically signed)  Attending Emergency Physician            Jarocho Conteh MD  10/08/21 8811

## 2021-10-08 NOTE — ED PROVIDER NOTES
3:07 PM: I discussed the history, physical examination, laboratory and imaging studies, and treatment plan with Dr. Deandre Gill. Héctor Nelson was signed out to me in stable condition. Please see Dr. Eliceo No documentation for details of their history, physical, and laboratory studies. Upon re-examination, a summary of Oscar Cerna's history, physical examination, and studies are as follows:    D-dimer pending, outpt stress planned. Patient's D-dimer came back negative. I discussed with her the need for follow-up and I am referring her back to Dr. Zehra Dumont for outpatient stress testing. Patient is in fact hungry and wants to be discharged. I advised her to return immediately for any new or worsening symptoms. We also discussed her heart score and the implication of this. She and her  are agreeable with the treatment plan. XR CHEST (2 VW)    Result Date: 10/8/2021  EXAMINATION: TWO XRAY VIEWS OF THE CHEST 10/8/2021 12:16 pm COMPARISON: 05/01/2014 HISTORY: ORDERING SYSTEM PROVIDED HISTORY: Chest pain TECHNOLOGIST PROVIDED HISTORY: Reason for exam:->Chest pain Reason for Exam: cp Acuity: Acute Type of Exam: Initial FINDINGS: The lungs are without acute focal process. There is no effusion or pneumothorax. The cardiomediastinal silhouette is stable. The osseous structures are stable. No acute process.    Results for orders placed or performed during the hospital encounter of 24/72/07   Basic Metabolic Panel   Result Value Ref Range    Sodium 140 136 - 145 mmol/L    Potassium 3.6 3.5 - 5.1 mmol/L    Chloride 106 99 - 110 mmol/L    CO2 23 21 - 32 mmol/L    Anion Gap 11 3 - 16    Glucose 88 70 - 99 mg/dL    BUN 15 7 - 20 mg/dL    CREATININE 0.6 0.6 - 1.2 mg/dL    GFR Non-African American >60 >60    GFR African American >60 >60    Calcium 9.3 8.3 - 10.6 mg/dL   Troponin   Result Value Ref Range    Troponin <0.01 <0.01 ng/mL   CBC Auto Differential   Result Value Ref Range    WBC 7.1 4.0 - 11.0 K/uL RBC 4.07 4.00 - 5.20 M/uL    Hemoglobin 12.5 12.0 - 16.0 g/dL    Hematocrit 36.0 36.0 - 48.0 %    MCV 88.5 80.0 - 100.0 fL    MCH 30.7 26.0 - 34.0 pg    MCHC 34.7 31.0 - 36.0 g/dL    RDW 12.9 12.4 - 15.4 %    Platelets 517 351 - 320 K/uL    MPV 7.9 5.0 - 10.5 fL    Neutrophils % 46.5 %    Lymphocytes % 43.5 %    Monocytes % 7.8 %    Eosinophils % 1.3 %    Basophils % 0.9 %    Neutrophils Absolute 3.3 1.7 - 7.7 K/uL    Lymphocytes Absolute 3.1 1.0 - 5.1 K/uL    Monocytes Absolute 0.6 0.0 - 1.3 K/uL    Eosinophils Absolute 0.1 0.0 - 0.6 K/uL    Basophils Absolute 0.1 0.0 - 0.2 K/uL   Troponin   Result Value Ref Range    Troponin <0.01 <0.01 ng/mL   D-Dimer, Quantitative   Result Value Ref Range    D-Dimer, Quant <200 0 - 229 ng/mL DDU   EKG 12 Lead   Result Value Ref Range    Ventricular Rate 72 BPM    Atrial Rate 72 BPM    P-R Interval 194 ms    QRS Duration 82 ms    Q-T Interval 404 ms    QTc Calculation (Bazett) 442 ms    P Axis 61 degrees    R Axis 10 degrees    T Axis 56 degrees    Diagnosis       Normal sinus rhythmNormal ECGWhen compared with ECG of 12-OCT-2016 13:25,No significant change was foundConfirmed by Raj Aponte ([id number]) on 10/8/2021 5:30:30 PM       I estimate there is LOW risk for PULMONARY EMBOLISM, ACUTE CORONARY SYNDROME, OR THORACIC AORTIC DISSECTION, thus I consider the discharge disposition reasonable. [address] [place] [address],[address] and I have discussed the diagnosis and risks, and we agree with discharging home to follow-up with their primary doctor. We also discussed returning to the Emergency Department immediately if new or worsening symptoms occur. We have discussed the symptoms which are most concerning (e.g., bloody sputum, fever, worsening pain or shortness of breath, vomiting) that necessitate immediate return. FINAL Impression    1. Chest pain, unspecified type    2. Vertigo        Blood pressure 136/65, pulse 60, temperature 97.8 °F (36.6 °C), temperature source Oral, resp.  rate 17, last menstrual period 01/10/2015, SpO2 99 %, not currently breastfeeding.          Pavan Corrigan MD  10/08/21 2612

## 2021-10-08 NOTE — ED NOTES
Bed: 11  Expected date:   Expected time:   Means of arrival:   Comments:  [facility] [address]NAM[address]IE Ambrosio  10/08/21 1210

## 2021-10-08 NOTE — CONSULTS
[facility]  [phone]      Attending Physician: Kristofer Scott MD  Reason for Consultation/Chief Complaint: Chest pain    Subjective   History of Present Illness:  Marlo Guerrero is a 79 y.o. patient who presented to the hospital with complaints of chest pain over the last few days to weeks, patient notes these symptoms began with palpitations, then she feels dizziness and vertigo as well as a pressure in her chest.  She has not lost consciousness. She presented to her family doctor and she looked uncomfortable with vertigo and was redirected to the emergency room. Since presentation emergency room, she says she feels better but does have residual chest pressure. Work-up was negative including negative troponin. Patient has a cardiac history which dates back to , was evaluated for chest pain at that time at [facility], she underwent a stress echocardiogram which was abnormal.  She had followed up ultimately with Dr. Akua Mccullough and last saw Dr. Ruggiero in 2019 and at that time, patient was felt to be stable with regard to history of hypercholesterolemia/palps. Patient states she has had no recent changes in her medical history or changes in her medications. It was noted at the last office visit that she wanted to discontinue statin and had done so but she was asked to resume this. She was also asked to take aspirin. He is compliant with them. She does however indicate that she did have a right foot injury and has been seeing orthopedics for that and is wearing a brace for that is noted some swelling in her right leg. Past Medical History:   has a past medical history of Allergic rhinitis, Arthritis, Depression, GERD (gastroesophageal reflux disease), Hyperlipidemia, Knee pain, right, Lipoma, Migraine headache, Nausea & vomiting, and Osteopenia.     Surgical History:   has a past surgical history that includes  section; Bunionectomy; Knee arthroscopy ([id number]); Upper gastrointestinal endoscopy (07/10/2017); Colonoscopy (1/07); and Colonoscopy (07/10/2017). Social History:   reports that she has never smoked. She has never used smokeless tobacco. She reports current alcohol use. She reports that she does not use drugs. Family History:  family history includes Anxiety Disorder in her mother; Coronary Art Dis in her father; Dementia in her father, maternal grandfather, and maternal uncle; Depression in her mother; Diabetes in her maternal uncle, paternal grandfather, and paternal uncle; Heart Disease in her father, maternal aunt, maternal grandfather, mother, and paternal grandmother; Heart Failure in her mother; High Blood Pressure in her mother and another family member; High Cholesterol in her father; Migraines in her brother, mother, sister, and another family member; Stroke in her father and paternal grandfather. Home Medications:  Were reviewed and are listed in nursing record and/or below  Prior to Admission medications    Medication Sig Start Date End Date Taking? Authorizing Provider   meclizine (ANTIVERT) 25 MG tablet Take 1 tablet by mouth 3 times daily as needed for Dizziness 10/8/21 10/18/21 Yes Danny Alanis MD   gabapentin (NEURONTIN) 100 MG capsule 1 tablet at night for 2 weeks, then increase to 2 tablets at night. . 9/6/18 1/4/19  HELGA Braun - CNP   amitriptyline (ELAVIL) 25 MG tablet Take 1 tablet by mouth nightly as needed 8/23/18   Historical Provider, MD   valACYclovir (VALTREX) 1 g tablet Take 1 tablet by mouth 3 times daily 8/23/18   Historical Provider, MD   meclizine (ANTIVERT) 25 MG tablet 1 po q 6 hrs prn 7/5/18   HELGA Catherine - CNP   loratadine (CLARITIN) 10 MG capsule Take 10 mg by mouth daily    Historical Provider, MD   albuterol sulfate HFA (PROAIR HFA) 108 (90 Base) MCG/ACT inhaler Inhale 2 puffs into the lungs every 6 hours as needed for Wheezing 3/6/18   Franklin Waggoner MD eletriptan (RELPAX) 40 MG tablet Take 1 tablet by mouth once as needed (migraine) may repeat in 2 hours if necessary. Maximum of 2 doses in a 24 hour period. 11/28/17 11/28/17  Deandre Rayo MD   sucralfate (CARAFATE) 1 GM tablet  9/5/17   Historical Provider, MD   Multiple Vitamin (MULTIVITAMINS PO) Take by mouth    Historical Provider, MD   Probiotic Product (PROBIOTIC DAILY PO) Take  by mouth. Historical Provider, MD   aspirin 81 MG EC tablet Take 81 mg by mouth daily. Historical Provider, MD   Flaxseed, Linseed, (FLAX SEED OIL) 1000 MG CAPS Take 1 capsule by mouth daily. Historical Provider, MD        CURRENT Medications:  methylPREDNISolone acetate (DEPO-MEDROL) injection 40 mg, Once        Allergies:  Levofloxacin, Omnicef, Acyclovir and related, Erythromycin, Lipitor, Topamax, Diclofenac, and Penicillins     Review of Systems:   A 14 point review of symptoms completed. Pertinent positives identified in the HPI, all other review of symptoms negative as below.       Objective   PHYSICAL EXAM:    Vitals:    10/08/21 1320   BP: 136/65   Pulse: 60   Resp: 17   Temp:    SpO2: 99%              General Appearance:  Alert, cooperative, no distress, appears stated age   Head:  Normocephalic, without obvious abnormality, atraumatic   Eyes:  PERRL, conjunctiva/corneas clear   Nose: Nares normal, no drainage or sinus tenderness   Throat: Lips, mucosa, and tongue normal   Neck: Supple, symmetrical, trachea midline, no adenopathy, thyroid: not enlarged, symmetric, no tenderness/mass/nodules, no carotid bruit or JVD   Lungs:   Clear to auscultation bilaterally, respirations unlabored   Chest Wall:  No deformity or tenderness   Heart:  Regular rate and rhythm, S1, S2 normal, no murmur, rub or gallop   Abdomen:   Soft, non-tender, bowel sounds active all four quadrants,  no masses, no organomegaly   Extremities: Extremities 1+ right lower extremity swelling at the ankle with an ankle brace noted   Pulses: 2+ and symmetric   Skin: Skin color, texture, turgor normal, no rashes or lesions   Pysch: Normal mood and affect   Neurologic: Normal gross motor and sensory exam.         Labs   CBC:   Lab Results   Component Value Date    WBC 7.1 10/08/2021    RBC 4.07 10/08/2021    HGB 12.5 10/08/2021    HCT 36.0 10/08/2021    MCV 88.5 10/08/2021    RDW 12.9 10/08/2021     10/08/2021     CMP:  Lab Results   Component Value Date     10/08/2021    K 3.6 10/08/2021     10/08/2021    CO2 23 10/08/2021    BUN 15 10/08/2021    CREATININE 0.6 10/08/2021    GFRAA >60 10/08/2021    GFRAA >60 05/13/2013    AGRATIO 2.0 09/06/2018    LABGLOM >60 10/08/2021    GLUCOSE 88 10/08/2021    PROT 7.0 09/06/2018    PROT 6.9 03/30/2012    CALCIUM 9.3 10/08/2021    BILITOT 0.3 09/06/2018    ALKPHOS 82 09/06/2018    AST 20 09/06/2018    ALT 15 09/06/2018     PT/INR:  No results found for: PTINR  HgBA1c:No results found for: LABA1C  Lab Results   Component Value Date    INMKTGS 472 11/16/2015    TROPONINI <0.01 10/08/2021         Cardiac Data     Last EKG: Normal sinus rhythm, normal EKG    Echo:    Stress Test:    Cath:    Studies:     cxr           Impression   No acute process.             I have reviewed labs and imaging/xray/diagnostic testing in this note. Assessment and Plan          Patient Active Problem List   Diagnosis    Migraine headache    Depression    Hyperlipidemia    GERD (gastroesophageal reflux disease)    Intractable chronic migraine without aura    Supraorbital neuralgia    Sleep apnea    Degenerative arthritis of cervical spine    Left hand pain    Closed nondisplaced fracture of body of hamate of left wrist       Chest pain, palpitations, options discussed the patient and family at the bedside, they favor outpatient evaluation which is reasonable.   Serial troponin levels will be drawn and if those are negative and patient is able to ambulate without difficulty, then can proceed with outpatient evaluation at [facility] with her usual cardiac team.  Did discuss this with ER staff, can consider further evaluation of right leg swelling with D-dimer while she is in emergency room. If patient elects to stay in hospital, can consider inpatient cardiac study such as echocardiogram and stress test tomorrow. Continue aspirin daily    Palpitations, consider cardiac event monitor as outpatient    Our service is available for further evaluation management of patient likes to stay in hospital, otherwise we will plan on signing off, please call with any questions. Thank you for allowing us to participate in the care of [address] [place] [address],[address]. Please call me with any questions [phone].     Apryl Brown MD, [facility]   Interventional Cardiologist  [facility]  [phone] [facility]  [phone] [address] [place] [address]  10/8/2021 2:50 PM

## 2022-07-18 ENCOUNTER — HOSPITAL ENCOUNTER (EMERGENCY)
Age: 72
Discharge: HOME | End: 2022-07-18
Attending: EMERGENCY MEDICINE
Payer: COMMERCIAL

## 2022-07-18 VITALS
OXYGEN SATURATION: 100 % | RESPIRATION RATE: 16 BRPM | SYSTOLIC BLOOD PRESSURE: 163 MMHG | HEART RATE: 71 BPM | DIASTOLIC BLOOD PRESSURE: 83 MMHG

## 2022-07-18 DIAGNOSIS — R04.0 EPISTAXIS: Primary | ICD-10-CM

## 2022-07-18 PROCEDURE — 99282 EMERGENCY DEPT VISIT SF MDM: CPT

## 2022-07-18 ASSESSMENT — PAIN - FUNCTIONAL ASSESSMENT
PAIN_FUNCTIONAL_ASSESSMENT: 0-10
PAIN_FUNCTIONAL_ASSESSMENT: NONE - DENIES PAIN

## 2022-07-18 ASSESSMENT — PAIN SCALES - GENERAL: PAINLEVEL_OUTOF10: 3

## 2022-07-19 NOTE — ED NOTES
Pt request something to eat. Pt provided with aisha crackers/peanut butter/ice water.      Luba Carlton LPN  02/80/31 2408

## 2022-07-19 NOTE — ED PROVIDER NOTES
I independently performed a history and physical on [address] [place] [address],[address]. All diagnostic, treatment, and disposition decisions were made by myself in conjunction with the advanced practice provider. I personally saw the patient and performed a substantive portion of the visit including all aspects of the medical decision making. For further details of [facility] emergency department encounter, please see LUISA Costa's documentation. Patient is a 70-year-old female presenting today due to concern for 3 episodes of epistaxis starting initially around 1:30 PM at which point she went to see the [facility] earlier today since it did come back the second time and at that point it did seem to stop but after receiving Afrin, the bleeding returned again which ultimately led her to coming to the emergency department. She does have a chronic history of sinus issues which she does see ENT for. She denies any recent cough or sneezing that would have brought on the epistaxis. She did have a epistaxis draining down the back of her throat earlier today but denies any currently. By the time I saw her, the bleeding had stopped without any intervention in the ED and she had no complaints at this time. She denies any chest pain or shortness of breath. She denies any fever. She denies any nasal discomfort. She denies any sore throat. She is not on any blood thinners. She has no other concerns at this time and felt comfortable going home. Physical:   Gen: No acute distress. AOx3.   Psych: Normal mood and affect  HEENT: NCAT, MMM, no pharyngeal erythema, no blood seen in the back of the throat, no epistaxis or septal hematoma bilaterally, no frontal or maxillary sinus tenderness to palpation  Neck: supple, normal range of motion, nontender to palpation  Cardiac: RRR, pulses 2+ in upper extremities  Lungs: Normal effort  Skin: No erythema noted to face  Neuro: Moving all extremities equally, GCS equals 15    MDM: Patient was evaluated due to concern for epistaxis that had stopped by the time I saw her. I did have her blow her nose hard in the room while I was seeing her in no epistaxis return and therefore this time I do believe she is safe for discharge. She does have an ENT and therefore was told to follow-up over the next couple days for repeat assessment but at this time I do not feel need for any further management. She knows to return to the emergency department for any return of epistaxis overnight but otherwise call her ENT in the morning. She was well-appearing and in no acute distress at time of discharge and felt comfortable this plan. I did recommend using Vaseline in the nostril for lubrication over the next couple of days. She denies any chest pain or shortness of breath and story not concerning for acute coronary syndrome or pulm embolism.         Mary Kate Smart MD  07/20/22 [id number]

## 2022-07-19 NOTE — ED PROVIDER NOTES
[facility] Emergency Department    CHIEF COMPLAINT  Epistaxis (Pt reports that at 1330 began with a nose bleed. Pt has been attempting to stop this since. Passing a few clot. Pt went to urgent care. Tried afrin without relief. )      SHARED SERVICE VISIT  I have seen and evaluated this patient with my supervising physician, Dr. Shola Flores. HISTORY OF PRESENT ILLNESS  Barak Champagne is a 70 y.o. female who presents to the ED complaining of nosebleed. Patient states that earlier this afternoon she was driving to work when she began experiencing nosebleed. Patient states her nose began bleeding for approximately 10 minutes. Patient states that after couple hours she been experiencing a nosebleed and decided to go to an urgent care. Patient said evaluation urgent care revealed no source of bleeding and the bleeding is stopped. Patient was instructed to report to ED should bleeding continue. Patient states that she began experiencing another nosebleed approximately 30 minutes before arriving to the ED. Patient is able to get bleeding stopped. Patient denies any trauma to her nose. Patient denies any dizziness, loss conscious, or fatigue. Patient denies any headaches, body aches, fevers, or chills. Patient denies any chest pain, shortness of breath, or difficulty breathing. Patient denies any abdominal pain, nausea, vomiting, or diarrhea. Patient denies any urinary symptoms. Patient denies any recent travel or sick contacts. No other complaints, modifying factors or associated symptoms. Nursing notes reviewed.    Past Medical History:   Diagnosis Date    Allergic rhinitis     Arthritis     Depression     GERD (gastroesophageal reflux disease)     Hyperlipidemia     Knee pain, right     Lipoma     Dr. Vazquez Child    Migraine headache     Dr. Nikolai Ferrell neurology    Nausea & vomiting     post-op    Osteopenia 2016    mild, femurs     Past Surgical History:   Procedure Laterality Date    BUNIONECTOMY       SECTION      twice    COLONOSCOPY      COLONOSCOPY  07/10/2017    KNEE ARTHROSCOPY  [id number]    rt knee    UPPER GASTROINTESTINAL ENDOSCOPY  07/10/2017    bx     Family History   Problem Relation Age of Onset    Heart Failure Mother     Heart Disease Mother         CHF    Migraines Mother     High Blood Pressure Mother     Depression Mother     Anxiety Disorder Mother     Coronary Art Dis Father     Heart Disease Father         MI 55    Dementia Father     High Cholesterol Father     Stroke Father     Migraines Other     High Blood Pressure Other     Migraines Sister     Migraines Brother     Heart Disease Maternal Aunt     Dementia Maternal Uncle     Diabetes Maternal Uncle     Diabetes Paternal Uncle     Dementia Maternal Grandfather     Heart Disease Maternal Grandfather     Heart Disease Paternal Grandmother     Diabetes Paternal Grandfather     Stroke Paternal Grandfather      Social History     Socioeconomic History    Marital status:      Spouse name: Not on file    Number of children: Not on file    Years of education: Not on file    Highest education level: Not on file   Occupational History    Not on file   Tobacco Use    Smoking status: Never    Smokeless tobacco: Never   Substance and Sexual Activity    Alcohol use:  Yes     Alcohol/week: 0.0 standard drinks     Comment: rarely     Drug use: No    Sexual activity: Not on file   Other Topics Concern    Not on file   Social History Narrative    Not on file     Social Determinants of Health     Financial Resource Strain: Not on file   Food Insecurity: Not on file   Transportation Needs: Not on file   Physical Activity: Not on file   Stress: Not on file   Social Connections: Not on file   Intimate Partner Violence: Not on file   Housing Stability: Not on file     Current Facility-Administered Medications   Medication Dose Route Frequency Provider Last Rate Last Admin    methylPREDNISolone acetate (DEPO-MEDROL) injection 40 mg  40 mg IntraMUSCular Once Bhargav Lake MD         Current Outpatient Medications   Medication Sig Dispense Refill    gabapentin (NEURONTIN) 100 MG capsule 1 tablet at night for 2 weeks, then increase to 2 tablets at night. . 60 capsule 0    amitriptyline (ELAVIL) 25 MG tablet Take 1 tablet by mouth nightly as needed  0    valACYclovir (VALTREX) 1 g tablet Take 1 tablet by mouth 3 times daily  0    meclizine (ANTIVERT) 25 MG tablet 1 po q 6 hrs prn 40 tablet 1    loratadine (CLARITIN) 10 MG capsule Take 10 mg by mouth daily      albuterol sulfate HFA (PROAIR HFA) 108 (90 Base) MCG/ACT inhaler Inhale 2 puffs into the lungs every 6 hours as needed for Wheezing 1 Inhaler 1    eletriptan (RELPAX) 40 MG tablet Take 1 tablet by mouth once as needed (migraine) may repeat in 2 hours if necessary. Maximum of 2 doses in a 24 hour period. 9 tablet 3    sucralfate (CARAFATE) 1 GM tablet       Multiple Vitamin (MULTIVITAMINS PO) Take by mouth      Probiotic Product (PROBIOTIC DAILY PO) Take  by mouth. aspirin 81 MG EC tablet Take 81 mg by mouth daily. Flaxseed, Linseed, (FLAX SEED OIL) 1000 MG CAPS Take 1 capsule by mouth daily. Allergies   Allergen Reactions    Levofloxacin Nausea And Vomiting     GI    Omnicef Shortness Of Breath and Swelling    Acyclovir And Related     Erythromycin Other (See Comments)     Stomach pain was hospitalized    Lipitor      Palpitations/breathing affected    Topamax      intolerant    Diclofenac Nausea And Vomiting     GI    Penicillins Rash       REVIEW OF SYSTEMS  10 systems reviewed, pertinent positives per HPI otherwise noted to be negative    PHYSICAL EXAM  BP (!) 172/96   Pulse 74   Resp 18   LMP 01/10/2015   SpO2 98%   GENERAL APPEARANCE: Awake and alert. Cooperative. No acute distress. Nontoxic in appearance  HEAD: Normocephalic. Atraumatic. EYES: PERRL. EOM's grossly intact.    ENT: No edema, erythema, or discharge noted in the external auditory canals. TMs are clear and shiny. Light reflex in anatomical position. No bulging, air-fluid levels noted on the TMs. Nasal mucosa slightly inflamed with no inflammation noted of the turbinates. No dried blood noted in the nares. No source of bleeding noted. No edema or erythema noted in the oropharynx. NECK: Supple. HEART: RRR. No murmurs, rubs, or gallops. Normal S1-S2. No S3 or S4.  LUNGS: Respirations unlabored. CTAB. Good air exchange. Speaking comfortably in full sentences. No rhonchi, rales, or wheezing. ABDOMEN: Soft. Non-distended. Non-tender. No guarding or rebound. No masses. No organomegaly. EXTREMITIES: No peripheral edema. Moves all extremities equally. All extremities neurovascularly intact. SKIN: Warm and dry. No acute rashes. NEUROLOGICAL: Alert and oriented. CN's 2-12 intact. No gross facial drooping. Strength 5/5, sensation intact. PSYCHIATRIC: Normal mood and affect. RADIOLOGY  No results found. ED COURSE  68-year-old female presents to the ED complaining of 3 episodes of epistaxis today. She was evaluated in urgent care after second episode today and was instructed to report to the ED should she develop any more episodes. She experienced an episode approximately 30 minutes before arrival in the ED. She is comfortably sitting in bed with nasal clip in place. After removing the nasal clip, there is no evidence of recurrent bleed. Patient observed in the ED for at least 30 minutes with no episodes of epistaxis. Patient remained hemodynamically stable. Decision was made to discharge patient home. Provided patient with strict return protocol and instructed patient to follow-up with ENT within the next 24 to 48 hours. Triage vitals /96, pulse 74, respirations 18, SPO2 98% on room air. Risk management discussed and shared decision making had with patient and/or surrogate. All questions were answered.  Patient will follow up with ENT for

## 2022-11-07 NOTE — PROGRESS NOTES
Pulmonary Outpatient Note   Mukesh Chen MD       11/8/2022    1. SOB (shortness of breath)    2. Chronic cough    3. Hoarseness of voice    4. COVID-19    5. JENNY (obstructive sleep apnea)          ASSESSMENT/PLAN:  Shortness of breath. Longstanding symptoms with negative work-up in the past.  At present she is recovering from COVID-19 infection. I would like her to recover from the current episode before doing any testing. Around first week of January we will get a PFT, 6-minute walk test, MIP and MEP in case she has a neuromuscular cause for her shortness of breath. In the interim I have asked her to increase her activity as tolerated  Chronic cough. Extensive work-up by ENT, allergy, pulmonology. Etiology is unclear at this point. Will await results of PFT, possibly get HRCT chest.  Hoarseness of voice. Closely followed by ENT, has been to speech therapy. COVID-19 infection. Explained to her that tachycardia with exercise, shortness of breath can persist.  It can take several months to improve. I do not believe at this point she manifests signs or symptoms of long haul COVID  JENNY. She does have a crowded oropharynx, at present we will hold off further work-up for JENNY. She does take naps in the daytime, possibly due to nonrestorative sleep  Prophylaxis. She has received 2 doses and a booster dose of the COVID-19 vaccine, has received her influenza vaccine, Pneumovax and Prevnar. She should hold off on the COVID-19 booster due to recent infection        RTC after her current symptoms related to COVID-19 infection resolve. Will obtain PFT, MIP, MEP and 6-minute walk test at that time, tentatively for first week in January. She should call back earlier if symptoms worsen        Orders Placed This Encounter   Procedures    Full PFT Study With Bronchodilator    6 Minute Walk Test    Lung Function Test MIP & MEP         No follow-ups on file. Chief Complaint:   New Patient (Self ref.  Breathing issues post COVID 9/4/22)       HPI: Daylin Ratliff is a 70y.o. year old female here for evaluation of shortness of breath. She is self-referred. Her PCP is Dr. Sebastian Meier, her cardiologist is Dr. Guillermina David, ENT surgeon Dr. Bryson Corley. Miguelito Palacios is a pleasant 75-year-old female, here for evaluation of shortness of breath, worse after her COVID-19 infection. She is accompanied by her , Naveed Khan. The patient has been an [place] resident. 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 She has been non-smoker. She drinks alcohol very occasionally. The patient has a history of JENNY, hyperlipidemia, possible reflux (denies symptoms), depression, anxiety attacks, DJD of the cervical spine. She has had allergy rhinitis, has been seen by ENT for the last 4 years. The patient had COVID-19 infection in early September. As per note from Dr. Doyle Galvez earlier this morning, since COVID infection she describes right chest tightness, back discomfort, tingling and numbness of her fingers, dyspnea and cough. Dr. Doyle Galvez felt that she may have costochondritis, but did recommend an echocardiogram.  The patient tells me that she tested positive on 9/4/2022. She had fever, cough and was very rundown. She was in bed for almost a week. Her  had COVID-19 infection in June with very mild chest congestion and nasal symptoms. Since that episode, she has felt tired, felt as though there is a truck on her chest.  She had mild chest pressure prior to COVID-19. She had similar milder symptoms prior to onset of COVID-19 infection. She feels sense of fatigue and feels a fog at times. She did not lose her taste or smell.   Her symptoms of chest discomfort and shortness of breath increased last week and, she says her blood pressure and heart rate were high with ambulation, rising from the 60 to 70/min at baseline to 100 per minute. In the last 2 days she is better with regards to her vital signs. The patient was very active, 4 years ago was walking 2 miles. She was unable to walk much after COVID infection, she seems to be slightly improving. In the past she was able to sing, does not feel that she has the air to seeing any longer. She has postnasal drip and cough, her postnasal drip is perennial.  The patient does have a cat and dog, denies any exposure to feather or down products. She does not have a hot tub. She has undergone allergy testing through her ENT physician. She has been on Xyzal, Singulair, Flovent 100, albuterol. She underwent chest imaging in September and October. The patient was told by her ENT physician that she could have asthma. She has described shortness of breath, more so that she cannot talk for a long time as she runs out of air. She has had chronic cough for 3 to 4 years. She did have PFT at [facility], saw pulmonology. She was seen by Dr. Marleny Clemons in November 2020, as per her note PFT showed DLCO 71% but was otherwise normal.  She was given a choice of methacholine challenge test versus a trial of Breztri, chose the latter. She has seen Dr. Salvador Chaudhry from allergy. As per notes from Dr. Eusebia Sharma, Dr. Cecily Dueñas found the patient to have normal spirometry and no allergies. She was undergoing specialized voice training through ENT, does have a gravelly voice. She has seen no benefit so far. Her voice gets more hoarse as the day goes on. She occasionally has hoarse voice due to talking a lot or coughing. She does choke easily, at times coughs violently after choking to the point where she vomits. This is not common. The patient began to see ENT since October 2019, with headache, congestion and choking. She was initially recommended PPI, Gaviscon, Claritin and Nasacort every other day.  Regarding her nasal allergies, she is not using azelastine due to bleeding. She is using Ayr gel, has not had any epistaxis recently. She describes nonspecific pain in the spine wrapping across the upper abdomen. DEXA scan in November 2021 showed osteopenia. She has not had any osteoporotic fractures. Prior imaging of the lumbar spine has shown significant DJD. She has had surgery for basal cell carcinoma of her nose in June 2020 at [facility]. The patient was seen by Dr. Anabel Paris today. As per his notes she had a negative stress echo in 2013, last year she underwent a 48-hour Holter monitor and had a 5 beat SVT. Echocardiogram was ordered. The patient goes to bed between 930 and 10 PM, does watch TV until 11 PM.  She was diagnosed with JENNY several years ago at a lab in [facility], has not used CPAP. Presently her  sleeps in another room. Her  says she does snore. She usually sleeps through the night, but sometimes wakes up after about 2 hours with nonspecific pain. She wakes up around 6 AM to go to the bathroom, attend to the cat and then often sleeps again until 7:30 AM.  She is occasionally refreshed, is sleepy in the daytime. Her  mentions she takes naps 3 to 4 days a week. CXR 9/8/2022, 10/3/2022 at [facility]  No acute abnormality    CT chest with contrast [facility]  No acute abnormality    CXR 10/8/2021  No acute process    CT lumbar spine 5/5/2021 [facility]  Multilevel degenerative disease and facet arthrosis most significant at L5-S1 with severe left and moderate right L5 foraminal narrowing and probable L5 foraminal nerve root impingement, clinical correlation is recommended. Levoscoliosis centered at L3-4 with 7 mm left lateral listhesis at L4-5.       There is no PFT in EMR    Past Medical History:   Diagnosis Date    Allergic rhinitis     Arthritis     Depression     GERD (gastroesophageal reflux disease)     Hyperlipidemia     Knee pain, right     Lipoma     Dr. Morel Qusanjiv    Migraine headache     Dr. Diamond Babcock neurology Nausea & vomiting     post-op    Osteopenia 2016    mild, femurs       Past Surgical History:   Procedure Laterality Date    BUNIONECTOMY       SECTION      twice    COLONOSCOPY      COLONOSCOPY  07/10/2017    KNEE ARTHROSCOPY  [id number]    rt knee    UPPER GASTROINTESTINAL ENDOSCOPY  07/10/2017    bx       Social History     Tobacco Use    Smoking status: Never    Smokeless tobacco: Never   Substance Use Topics    Alcohol use:  Yes     Alcohol/week: 0.0 standard drinks     Comment: rarely        Family History   Problem Relation Age of Onset    Heart Failure Mother     Heart Disease Mother         CHF    Migraines Mother     High Blood Pressure Mother     Depression Mother     Anxiety Disorder Mother     Coronary Art Dis Father     Heart Disease Father         MI 55    Dementia Father     High Cholesterol Father     Stroke Father     Migraines Other     High Blood Pressure Other     Migraines Sister     Migraines Brother     Heart Disease Maternal Aunt     Dementia Maternal Uncle     Diabetes Maternal Uncle     Diabetes Paternal Uncle     Dementia Maternal Grandfather     Heart Disease Maternal Grandfather     Heart Disease Paternal Grandmother     Diabetes Paternal Grandfather     Stroke Paternal Grandfather          Current Outpatient Medications:     benzonatate (TESSALON) 200 MG capsule, , Disp: , Rfl:     ibuprofen (ADVIL;MOTRIN) 600 MG tablet, Take 600 mg by mouth 3 times daily, Disp: , Rfl:     levocetirizine (XYZAL) 5 MG tablet, Take by mouth daily, Disp: , Rfl:     montelukast (SINGULAIR) 10 MG tablet, TAKE ONE TABLET BY MOUTH DAILY, Disp: , Rfl:     fluticasone (FLOVENT HFA) 110 MCG/ACT inhaler, Inhale 2 puffs into the lungs 2 times daily, Disp: , Rfl:     meclizine (ANTIVERT) 25 MG tablet, 1 po q 6 hrs prn, Disp: 40 tablet, Rfl: 1    albuterol sulfate HFA (PROAIR HFA) 108 (90 Base) MCG/ACT inhaler, Inhale 2 puffs into the lungs every 6 hours as needed for Wheezing, Disp: 1 Inhaler, Rfl: 1 eletriptan (RELPAX) 40 MG tablet, Take 1 tablet by mouth once as needed (migraine) may repeat in 2 hours if necessary. Maximum of 2 doses in a 24 hour period. , Disp: 9 tablet, Rfl: 3    Multiple Vitamin (MULTIVITAMINS PO), Take by mouth, Disp: , Rfl:     Probiotic Product (PROBIOTIC DAILY PO), Take  by mouth., Disp: , Rfl:     Flaxseed, Linseed, (FLAX SEED OIL) 1000 MG CAPS, Take 1 capsule by mouth daily. , Disp: , Rfl:     Levofloxacin, Omnicef, Acyclovir and related, Erythromycin, Lipitor, Topamax, Diclofenac, and Penicillins    Vitals:    11/08/22 1340   BP: (!) 148/65   Pulse: 76   Resp: 16   Temp: 96.9 °F (36.1 °C)   TempSrc: Temporal   SpO2: 99%   Weight: 152 lb 12.8 oz (69.3 kg)   Height: 5' 6\" (1.676 m)       Review of Systems   Constitutional:  Negative for appetite change, chills, diaphoresis, fatigue and fever. HENT:  Positive for congestion, nosebleeds and rhinorrhea. Negative for postnasal drip, sinus pressure, sneezing, sore throat, trouble swallowing and voice change. Eyes:  Negative for discharge, redness, itching and visual disturbance. Respiratory:  Positive for cough. Negative for apnea, choking, chest tightness, shortness of breath, wheezing and stridor. Cardiovascular:  Negative for chest pain, palpitations and leg swelling. Gastrointestinal:  Negative for abdominal distention, abdominal pain, blood in stool, constipation, diarrhea, nausea and vomiting. Endocrine: Negative for polyuria. Genitourinary:  Negative for decreased urine volume, difficulty urinating, dysuria, enuresis, frequency, hematuria and urgency. Musculoskeletal:  Negative for arthralgias, back pain, gait problem, joint swelling and myalgias. Skin:  Negative for rash. Allergic/Immunologic: Negative for environmental allergies and immunocompromised state. Neurological:  Negative for dizziness, tremors, seizures, weakness, light-headedness and headaches. Hematological:  Does not bruise/bleed easily. Psychiatric/Behavioral:  Positive for sleep disturbance. Negative for agitation, behavioral problems, confusion and hallucinations. All other systems reviewed and are negative. Physical Exam  Vitals reviewed. Constitutional:       General: She is not in acute distress. Appearance: She is well-developed. She is not ill-appearing, toxic-appearing or diaphoretic. Comments: Pleasant, averagely built, underweight appearing   HENT:      Head: Normocephalic and atraumatic. Nose: Nose normal. No congestion or rhinorrhea. Mouth/Throat:      Mouth: Mucous membranes are moist.      Pharynx: Oropharynx is clear. No oropharyngeal exudate or posterior oropharyngeal erythema. Comments: Class III airway, dental repair  Eyes:      General: No scleral icterus. Right eye: No discharge. Left eye: No discharge. Conjunctiva/sclera: Conjunctivae normal.      Pupils: Pupils are equal, round, and reactive to light. Comments: Wearing glasses   Neck:      Thyroid: No thyromegaly. Vascular: No JVD. Trachea: No tracheal deviation. Cardiovascular:      Rate and Rhythm: Normal rate and regular rhythm. Heart sounds: Normal heart sounds. No murmur heard. No friction rub. No gallop. Pulmonary:      Effort: Pulmonary effort is normal. No respiratory distress. Breath sounds: Normal breath sounds. No stridor. No wheezing, rhonchi or rales. Abdominal:      Palpations: Abdomen is soft. There is no mass. Tenderness: There is no abdominal tenderness. There is no guarding. Comments: Minimally fatty and protuberant abdomen   Musculoskeletal:      Right lower leg: No edema. Left lower leg: No edema. Lymphadenopathy:      Cervical: No cervical adenopathy. Skin:     General: Skin is warm and dry. Coloration: Skin is not jaundiced or pale. Findings: No bruising, erythema, lesion or rash.    Neurological:      Mental Status: She is alert and oriented to person, place, and time.       Gait: Gait normal.      Comments: Detailed neurologic exam not performed   Psychiatric:         Mood and Affect: Mood normal.         Behavior: Behavior normal.

## 2022-11-08 ENCOUNTER — OFFICE VISIT (OUTPATIENT)
Dept: PULMONOLOGY | Age: 72
End: 2022-11-08
Payer: COMMERCIAL

## 2022-11-08 VITALS
SYSTOLIC BLOOD PRESSURE: 148 MMHG | HEIGHT: 66 IN | WEIGHT: 152.8 LBS | TEMPERATURE: 96.9 F | RESPIRATION RATE: 16 BRPM | BODY MASS INDEX: 24.56 KG/M2 | HEART RATE: 76 BPM | OXYGEN SATURATION: 99 % | DIASTOLIC BLOOD PRESSURE: 65 MMHG

## 2022-11-08 DIAGNOSIS — R49.0 HOARSENESS OF VOICE: ICD-10-CM

## 2022-11-08 DIAGNOSIS — Z72.821 POOR SLEEP HYGIENE: ICD-10-CM

## 2022-11-08 DIAGNOSIS — U07.1 COVID-19: ICD-10-CM

## 2022-11-08 DIAGNOSIS — R06.02 SOB (SHORTNESS OF BREATH): Primary | ICD-10-CM

## 2022-11-08 DIAGNOSIS — G47.33 OSA (OBSTRUCTIVE SLEEP APNEA): ICD-10-CM

## 2022-11-08 DIAGNOSIS — R05.3 CHRONIC COUGH: ICD-10-CM

## 2022-11-08 PROCEDURE — 1123F ACP DISCUSS/DSCN MKR DOCD: CPT | Performed by: INTERNAL MEDICINE

## 2022-11-08 PROCEDURE — 99204 OFFICE O/P NEW MOD 45 MIN: CPT | Performed by: INTERNAL MEDICINE

## 2022-11-08 RX ORDER — BENZONATATE 200 MG/1
CAPSULE ORAL
COMMUNITY
Start: 2022-10-18

## 2022-11-08 RX ORDER — MONTELUKAST SODIUM 10 MG/1
TABLET ORAL
COMMUNITY
Start: 2022-04-04

## 2022-11-08 RX ORDER — IBUPROFEN 600 MG/1
600 TABLET ORAL 3 TIMES DAILY
COMMUNITY
Start: 2022-11-08 | End: 2022-11-15

## 2022-11-08 RX ORDER — LEVOCETIRIZINE DIHYDROCHLORIDE 5 MG/1
TABLET, FILM COATED ORAL DAILY
COMMUNITY

## 2022-11-08 RX ORDER — FLUTICASONE PROPIONATE 110 UG/1
2 AEROSOL, METERED RESPIRATORY (INHALATION) 2 TIMES DAILY
COMMUNITY

## 2022-11-08 NOTE — LETTER
[address] [facility]  [address] [address]  Phone: [phone]  Fax: [phone]           Rik Bush MD      November 8, 2022     Patient: Siria Heath   MR Number: [id number]   YOB: 1950   Date of Visit: 11/8/2022       Dear HELGA Raymond- CNP:    Claudine Payne seen today for pulmonary issues. Below are the relevant portions of my assessment and plan of care. If you have questions, please do not hesitate to call me. I look forward to following Peg Eth along with you.     Sincerely,        Rik Bush MD    CC providers:  Blu Klein MD  [address] [facility]  [address] [facility]  Via Fax: [phone]     Doug Su MD  [facility] [id number]  Via Fax: [phone]     HELGA Raymond  [address] [address]  Via Fax: [phone]
Health Care Proxy (HCP)

## 2022-11-08 NOTE — PROGRESS NOTES
MA Communication:   The following orders are received by verbal communication from Nargis Horner MD    Orders include:  PFT 6 min walk 1/10       FU

## 2022-11-08 NOTE — PATIENT INSTRUCTIONS
Remember to bring a list of pulmonary medications and any CPAP or BiPAP machines to your next appointment with the office. Please keep all of your future appointments scheduled by [facility]. Out of respect for other patients and providers, you may be asked to reschedule your appointment if you arrive later than your scheduled appointment time. Appointments cancelled less than 24hrs in advance will be considered a no show. Patients with three missed appointments within 1 year or four missed appointments within 2 years can be dismissed from the practice. Please be aware that our physicians are required to work in the Intensive Care Unit at [facility].  Your appointment may need to be rescheduled if they are designated to work during your appointment time. You may receive a survey regarding the care you received during your visit. Your input is valuable to us. We encourage you to complete and return your survey. We hope you will choose us in the future for your healthcare needs. Pt instructed of all future appointment dates & times, including radiology, labs, procedures & referrals. If procedures were scheduled preparation instructions provided. Instructions on future appointments with [facility] were given.

## 2022-11-09 ASSESSMENT — ENCOUNTER SYMPTOMS
CONSTIPATION: 0
SHORTNESS OF BREATH: 0
RHINORRHEA: 1
EYE ITCHING: 0
COUGH: 1
APNEA: 0
VOMITING: 0
ABDOMINAL PAIN: 0
SINUS PRESSURE: 0
ABDOMINAL DISTENTION: 0
CHOKING: 0
VOICE CHANGE: 0
STRIDOR: 0
TROUBLE SWALLOWING: 0
DIARRHEA: 0
NAUSEA: 0
SORE THROAT: 0
CHEST TIGHTNESS: 0
BLOOD IN STOOL: 0
EYE DISCHARGE: 0
WHEEZING: 0
EYE REDNESS: 0
BACK PAIN: 0

## 2023-01-06 ENCOUNTER — TELEPHONE (OUTPATIENT)
Dept: PULMONOLOGY | Age: 73
End: 2023-01-06

## 2023-01-06 NOTE — TELEPHONE ENCOUNTER
LM,Appointment for 1/10 was cancelled MD out and Pt.  Has not had PFT or 6 min walk she will need to RS test as well as OV

## 2023-01-25 ENCOUNTER — HOSPITAL ENCOUNTER (OUTPATIENT)
Dept: PULMONOLOGY | Age: 73
Discharge: HOME | End: 2023-01-25
Payer: COMMERCIAL

## 2023-01-25 VITALS — OXYGEN SATURATION: 97 %

## 2023-01-25 DIAGNOSIS — R06.02 SOB (SHORTNESS OF BREATH): ICD-10-CM

## 2023-01-25 DIAGNOSIS — R49.0 HOARSENESS OF VOICE: ICD-10-CM

## 2023-01-25 DIAGNOSIS — R05.3 CHRONIC COUGH: ICD-10-CM

## 2023-01-25 DIAGNOSIS — U07.1 COVID-19: ICD-10-CM

## 2023-01-25 LAB
DLCO %PRED: NORMAL
DLCO PRED: NORMAL
DLCO/VA %PRED: NORMAL
DLCO/VA PRED: NORMAL
DLCO/VA: NORMAL
DLCO: 97 ML/MIN/MMHG
EXPIRATORY TIME-POST: NORMAL
EXPIRATORY TIME: NORMAL
FEF 25-75% %CHNG: NORMAL
FEF 25-75% %PRED-POST: NORMAL
FEF 25-75% %PRED-PRE: NORMAL
FEF 25-75% PRED: NORMAL
FEF 25-75%-POST: NORMAL
FEF 25-75%-PRE: NORMAL
FEV1 %PRED-POST: NORMAL
FEV1 %PRED-PRE: NORMAL
FEV1 PRED: NORMAL
FEV1-POST: 122 L
FEV1-PRE: 119 L
FEV1/FVC %PRED-POST: NORMAL
FEV1/FVC %PRED-PRE: NORMAL
FEV1/FVC PRED: NORMAL
FEV1/FVC-POST: 90 %
FEV1/FVC-PRE: 89 %
FVC %PRED-POST: NORMAL
FVC %PRED-PRE: NORMAL
FVC PRED: NORMAL
FVC-POST: 133 L
FVC-PRE: 132 L
GAW %PRED: NORMAL
GAW PRED: NORMAL
GAW: NORMAL
IC %PRED: NORMAL
IC PRED: NORMAL
IC: NORMAL
MEP: NORMAL
MIP: NORMAL
MVV %PRED-PRE: NORMAL
MVV PRED: NORMAL
MVV-PRE: NORMAL
PEF %PRED-POST: NORMAL
PEF %PRED-PRE: NORMAL
PEF PRED: NORMAL
PEF%CHNG: NORMAL
PEF-POST: NORMAL
PEF-PRE: NORMAL
RAW %PRED: NORMAL
RAW PRED: NORMAL
RAW: NORMAL
RV %PRED: NORMAL
RV PRED: NORMAL
RV: NORMAL
SVC %PRED: NORMAL
SVC PRED: NORMAL
SVC: NORMAL
TLC %PRED: NORMAL
TLC PRED: NORMAL
TLC: 107 L
VA %PRED: NORMAL
VA PRED: NORMAL
VA: NORMAL
VTG %PRED: NORMAL
VTG PRED: NORMAL
VTG: NORMAL

## 2023-01-25 PROCEDURE — 94729 DIFFUSING CAPACITY: CPT

## 2023-01-25 PROCEDURE — 94799 UNLISTED PULMONARY SVC/PX: CPT

## 2023-01-25 PROCEDURE — 94726 PLETHYSMOGRAPHY LUNG VOLUMES: CPT

## 2023-01-25 PROCEDURE — 94060 EVALUATION OF WHEEZING: CPT

## 2023-01-25 PROCEDURE — 94618 PULMONARY STRESS TESTING: CPT

## 2023-01-25 PROCEDURE — 6370000000 HC RX 637 (ALT 250 FOR IP): Performed by: INTERNAL MEDICINE

## 2023-01-25 RX ORDER — ALBUTEROL SULFATE 90 UG/1
4 AEROSOL, METERED RESPIRATORY (INHALATION) ONCE
Status: COMPLETED | OUTPATIENT
Start: 2023-01-25 | End: 2023-01-25

## 2023-01-25 RX ADMIN — Medication 4 PUFF: at 14:19

## 2023-01-25 ASSESSMENT — PULMONARY FUNCTION TESTS
FEV1_PRE: 119
FEV1/FVC_POST: 90
FEV1/FVC_PRE: 89
FVC_POST: 133
FVC_PRE: 132
FEV1_POST: 122

## 2023-01-25 NOTE — PROGRESS NOTES
[facility] Pulmonary Function Lab - Six Minute Walk      Test Performed on:   Room Air___x___   Oxygen at _____ lpm via N/C- continuous Oxygen at _____ lpm via N/C- OCD  Assist Device Used During Test:  None__x____ Cane________ Walker_________    Modified Tiffany's Scale  0 Nothing at all 5 Strong    0.5 Just noticeable 6 Stronger (Hard)    1 Very Light 7 Very Strong   2 Light 8 Very Very Strong   3 Moderate 9 Extremely Strong   4 Somewhat Strong 10 Maximum All out      Time SpO2 Heart Rate Respiratory Rate Dyspnea-  Modified Tiffanys Scale Fatigue- Modified Tiffanys Scale Other Symptoms   Baseline                     97% 67 18 1 1      1 minute                     97% 86 20 2 2    2 minutes                     97% 71 21 2 2      3 minutes                     96% 81 22 3 3    4 minutes                     97% 90 22 3 3    5 minutes                     97% 91 23 3 3    6 minutes                     98% 91 23 3 3    Recovery x 1 minute                     98% 70 22 2 2    Recovery x 2 Minute                     98% 95 20 1 1     Number of Laps______15___ X 120 feet + _____0____ additional feet = Total Distance _1800______feet   Stopped or paused before 6 minutes? No__x_____ Yes ________  Total expected 6 MW distance is ____1448___feet. Patient achieved ___124___% of expected distance. Pre Blood Pressure:128/73  Other symptoms at the end of exercise: Increased shortness of breath, chest pressure noted

## 2023-01-26 ASSESSMENT — ENCOUNTER SYMPTOMS
BLOOD IN STOOL: 0
VOMITING: 0
SINUS PRESSURE: 0
CHOKING: 0
EYE ITCHING: 0
SHORTNESS OF BREATH: 0
CONSTIPATION: 0
ABDOMINAL DISTENTION: 0
STRIDOR: 0
TROUBLE SWALLOWING: 0
NAUSEA: 0
CHEST TIGHTNESS: 0
COUGH: 1
EYE REDNESS: 0
DIARRHEA: 0
APNEA: 0
EYE DISCHARGE: 0
ABDOMINAL PAIN: 0
BACK PAIN: 0
WHEEZING: 0
SORE THROAT: 0
RHINORRHEA: 1
VOICE CHANGE: 0

## 2023-01-26 NOTE — PROCEDURES
[address] [address]                               PULMONARY FUNCTION    PATIENT NAME: Jh Spaulding                  :        1950  MED REC NO:   [id number]                          ROOM:  ACCOUNT NO:   [de-identified]                           ADMIT DATE: 2023  PROVIDER:     Lesia Verde MD    DATE OF PROCEDURE:  2023    PFT INTERPRETATION    The patient is a 77-year-old female who underwent a PFT for shortness of  breath. Spirometry shows FVC to be 132%, FEV1 to be 119%, FEV1 to FVC  ratio was 89%, EVU20-78% was 83%. The patient did not have any  significant postbronchodilator improvement. Lung volume shows total  lung capacity was normal.  The patient does not have any air trapping or  hyperinflation. The patient's diffusion capacity when adjusted for  volume was normal.  The patient's flow-volume loop was normal.  The  patient also underwent a 6-minute walk test which shows baseline oxygen  saturation of 97% at room air at rest with a heart rate of 67,  respiratory rate of 18, dyspnea-modified Tiffany scale of 1,  fatigue-modified Tiffany scale of 1. The patient did not have any  exertional hypoxemia on the study. The patient walked 1800 feet. The  patient's total expected 6-minute walk distance was 1448 feet. The  patient achieved 124% of the expected distance. On the basis of this  PFT, the patient has normal PFT. Along with that, the patient does not  have any exertional hypoxemia on optimal exercise. Please correlate  clinically.         Doug Rushing MD    D: 2023 18:47:58       T: 2023 18:50:46     [id number]  Job#: [id number]     Doc#: [id number]    CC:

## 2023-01-26 NOTE — PROGRESS NOTES
Pulmonary Outpatient Note   Padilla Aguillon MD       1/27/2023    1. Chronic cough    2. SOB (shortness of breath)    3. Hoarseness of voice    4. History of 2019 novel coronavirus disease (COVID-19)            ASSESSMENT/PLAN:  Shortness of breath. Unclear etiology. PFT, 6-minute walk test, MIP and MEP were normal.  I reassured the patient that this is suggestive of no serious pulmonary problems. Her cardiac work-up has been negative. Increase activity as tolerated. I do not believe cardiac MRI is necessary to look for long COVID  Chronic cough. Extensive work-up by ENT, allergy, pulmonology. Etiology is unclear at this point. Given her PFT, at present will hold off HRCT chest.  Occasionally patients can have processes such as eosinophilic bronchitis that would merit bronchoscopic evaluation of the airway. I would like to try low-dose gabapentin as tolerated and increase the dose. She was told that this was a trial, side effects were possible. She will let me know via [employer]. Start with 1 daily and increase to 2 daily as tolerated. Hoarseness of voice. Closely followed by ENT, has been to speech therapy. No change in symptoms  COVID-19 infection. She thinks she may be slightly better, it could still take several months for her symptoms to improve  JENNY. She does have a crowded oropharynx, at present we will hold off further work-up for JENNY. She does take naps in the daytime, possibly due to nonrestorative sleep  Prophylaxis. She has received 2 doses and a booster dose of the COVID-19 vaccine, has received her influenza vaccine, Pneumovax and Prevnar. She could consider the bivalent COVID-19 booster      RTC as needed, communicate through [employer] about response to gabapentin. She should call back earlier if symptoms worsen        No orders of the defined types were placed in this encounter. No follow-ups on file.       Chief Complaint:   Follow-up, Shortness of Breath, and Results (PFT) HPI: Marcell Magallanes is a 67y.o. year old female here for follow-up for shortness of breath. She is accompanied by her , Radha Parra. Her PCP is ROSALIE Sarabia, her cardiologist is Dr. Danika Ghosh, ENT surgeon Dr. Lynette Gunderson. Celia Madden was seen on 11/8/2022, presents for follow-up visit after completing testing. Most of the patient's symptoms began after she had COVID-19 infection on 9/4/2020. She continues to complain of squeezing and tightness in the retrosternal region with fluctuating levels of dry cough. She sometimes brings up small amounts of phlegm with a yellow tinge. She denies hemoptysis. He denies wheezing. She has chest pressure. She sometimes gets choked while drinking water. At one point she had cough to the point she almost vomited, but did not actually have episodes of vomiting. Her voice tends to get raspy at the end of the day. Symptoms of cough and chest tightness have been present for almost 3-1/2 years, possibly worse after her COVID episode. I reviewed her records from [facility] with regards to ENT. There was discussion of chronic ethmoid sinusitis, CT scan had not shown any abnormality. She had bowed vocal cords. She was tried on nasal sprays including azelastine, Flovent, albuterol. She had been given Singulair for some time, but stopped it due to tingling, numbness, getting tearful. She denies GE reflux. The rest of her ROS was unchanged. There was no other change in her medical or surgical history since her last visit. PFT 1/26/2023  FVC 3.8 L, 132% predicted, FEV1 2.65 L, 119% predicted with ratio of 70%. There was no response to bronchodilator. Lung volumes are normal, diffusion capacity was normal    6-minute walk test 1/26/2023  Baseline oxygen saturation 97%, Tiffany scales 1 each. The patient walked 1800 feet, 124% of predicted distance. She did not stop, had no desaturation, had mild increase in heart rate and respiratory rate.   Tiffany scales peaked at 3 each    MIP, MEP 1/26/2023  MIP -48. 1 cm H2O, 88% predicted  MEP 38.05 cmH2O, 70% predicted    Echocardiogram 1/19/2023 [facility])  - Left ventricle: The cavity size is normal. Wall thickness is normal. Systolic function was normal. The calculated ejection fraction was in the range     of 59% to 63%. Wall motion was normal;  there were no regional wall motion abnormalities. Normal diastolic function. The stroke volume is 52ml. The stroke index is 29ml/m^2. The global longitudinal strain was -19%. - Aortic valve: The peak systolic gradient is 8mm Hg. - Inferior vena cava: The vessel was normal in size; the respirophasic diameter changes were in the normal range (&gt;= 50%); findings are      consistent with normal central venous pressure. Previous notes reviewed and edited as necessary. Rossana Catherine is a pleasant 61-year-old female, here for evaluation of shortness of breath, worse after her COVID-19 infection. She is accompanied by her , Valorie Izaguirre. The patient has been an [place] resident. 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 She has been non-smoker. She drinks alcohol very occasionally. The patient has a history of JENNY, hyperlipidemia, possible reflux (denies symptoms), depression, anxiety attacks, DJD of the cervical spine. She has had allergy rhinitis, has been seen by ENT for the last 4 years. The patient had COVID-19 infection in early September. As per note from Dr. Kofi Donaldson earlier this morning, since COVID infection she describes right chest tightness, back discomfort, tingling and numbness of her fingers, dyspnea and cough. Dr. Kofi Donaldson felt that she may have costochondritis, but did recommend an echocardiogram.  The patient tells me that she tested positive on 9/4/2022. She had fever, cough and was very rundown.   She was in bed for almost a week. Her  had COVID-19 infection in June with very mild chest congestion and nasal symptoms. Since that episode, she has felt tired, felt as though there is a truck on her chest.  She had mild chest pressure prior to COVID-19. She had similar milder symptoms prior to onset of COVID-19 infection. She feels sense of fatigue and feels a fog at times. She did not lose her taste or smell. Her symptoms of chest discomfort and shortness of breath increased last week and, she says her blood pressure and heart rate were high with ambulation, rising from the 60 to 70/min at baseline to 100 per minute. In the last 2 days she is better with regards to her vital signs. The patient was very active, 4 years ago was walking 2 miles. She was unable to walk much after COVID infection, she seems to be slightly improving. In the past she was able to sing, does not feel that she has the air to seeing any longer. She has postnasal drip and cough, her postnasal drip is perennial.  The patient does have a cat and dog, denies any exposure to feather or down products. She does not have a hot tub. She has undergone allergy testing through her ENT physician. She has been on Xyzal, Singulair, Flovent 100, albuterol. She underwent chest imaging in September and October. The patient was told by her ENT physician that she could have asthma. She has described shortness of breath, more so that she cannot talk for a long time as she runs out of air. She has had chronic cough for 3 to 4 years. She did have PFT at [facility], saw pulmonology. She was seen by Dr. Annamaria Runner in November 2020, as per her note PFT showed DLCO 71% but was otherwise normal.  She was given a choice of methacholine challenge test versus a trial of Breztri, chose the latter. She has seen Dr. Reynold Kingsley from allergy. As per notes from Dr. Tonia Box, Dr. Rukhsana Thrasher found the patient to have normal spirometry and no allergies.   She was undergoing specialized voice training through ENT, does have a gravelly voice. She has seen no benefit so far. Her voice gets more hoarse as the day goes on. She occasionally has hoarse voice due to talking a lot or coughing. She does choke easily, at times coughs violently after choking to the point where she vomits. This is not common. The patient began to see ENT since October 2019, with headache, congestion and choking. She was initially recommended PPI, Gaviscon, Claritin and Nasacort every other day. Regarding her nasal allergies, she is not using azelastine due to bleeding. She is using Ayr gel, has not had any epistaxis recently. She describes nonspecific pain in the spine wrapping across the upper abdomen. DEXA scan in November 2021 showed osteopenia. She has not had any osteoporotic fractures. Prior imaging of the lumbar spine has shown significant DJD. She has had surgery for basal cell carcinoma of her nose in June 2020 at [facility]. The patient was seen by Dr. Manuel Sorto today. As per his notes she had a negative stress echo in 2013, last year she underwent a 48-hour Holter monitor and had a 5 beat SVT. Echocardiogram was ordered. The patient goes to bed between 930 and 10 PM, does watch TV until 11 PM.  She was diagnosed with JENNY several years ago at a lab in [facility], has not used CPAP. Presently her  sleeps in another room. Her  says she does snore. She usually sleeps through the night, but sometimes wakes up after about 2 hours with nonspecific pain. She wakes up around 6 AM to go to the bathroom, attend to the cat and then often sleeps again until 7:30 AM.  She is occasionally refreshed, is sleepy in the daytime. Her  mentions she takes naps 3 to 4 days a week.       CXR 9/8/2022, 10/3/2022 at [facility]  No acute abnormality    CT chest with contrast 10/20/2020 [facility]  No acute abnormality    CXR 10/8/2021  No acute process    CT lumbar spine 5/5/2021 [facility]  Multilevel degenerative disease and facet arthrosis most significant at L5-S1 with severe left and moderate right L5 foraminal narrowing and probable L5 foraminal nerve root impingement, clinical correlation is recommended.   Levoscoliosis centered at L3-4 with 7 mm left lateral listhesis at L4-5.      There is no PFT in EMR    Past Medical History:   Diagnosis Date    Allergic rhinitis     Arthritis     Depression     GERD (gastroesophageal reflux disease)     HTN (hypertension)     Hyperlipidemia     Knee pain, right     Lipoma     Dr. Disla    Migraine headache     Dr. Dejesus neurology    Nausea & vomiting     post-op    Osteopenia 2016    mild, femurs       Past Surgical History:   Procedure Laterality Date    BUNIONECTOMY       SECTION      twice    COLONOSCOPY      COLONOSCOPY  07/10/2017    KNEE ARTHROSCOPY  [id number]    rt knee    UPPER GASTROINTESTINAL ENDOSCOPY  07/10/2017    bx       Social History     Tobacco Use    Smoking status: Never    Smokeless tobacco: Never   Substance Use Topics    Alcohol use: Yes     Alcohol/week: 0.0 standard drinks     Comment: rarely        Family History   Problem Relation Age of Onset    Heart Failure Mother     Heart Disease Mother         CHF    Migraines Mother     High Blood Pressure Mother     Depression Mother     Anxiety Disorder Mother     Coronary Art Dis Father     Heart Disease Father         MI 46    Dementia Father     High Cholesterol Father     Stroke Father     Migraines Other     High Blood Pressure Other     Migraines Sister     Migraines Brother     Heart Disease Maternal Aunt     Dementia Maternal Uncle     Diabetes Maternal Uncle     Diabetes Paternal Uncle     Dementia Maternal Grandfather     Heart Disease Maternal Grandfather     Heart Disease Paternal Grandmother     Diabetes Paternal Grandfather     Stroke Paternal Grandfather          Current Outpatient Medications:     gabapentin (NEURONTIN) 100 MG capsule, Take 1  capsule by mouth 2 times daily for 180 days. Intended supply: 90 days, Disp: 180 capsule, Rfl: 1    losartan (COZAAR) 50 MG tablet, Take 50 mg by mouth daily, Disp: , Rfl:     propranolol (INDERAL LA) 60 MG extended release capsule, Take 60 mg by mouth daily, Disp: , Rfl:     simvastatin (ZOCOR) 20 MG tablet, Take 20 mg by mouth every evening, Disp: , Rfl:     benzonatate (TESSALON) 200 MG capsule, , Disp: , Rfl:     ibuprofen (ADVIL;MOTRIN) 600 MG tablet, Take 600 mg by mouth 3 times daily, Disp: , Rfl:     levocetirizine (XYZAL) 5 MG tablet, Take by mouth daily, Disp: , Rfl:     fluticasone (FLOVENT HFA) 110 MCG/ACT inhaler, Inhale 2 puffs into the lungs 2 times daily, Disp: , Rfl:     meclizine (ANTIVERT) 25 MG tablet, 1 po q 6 hrs prn, Disp: 40 tablet, Rfl: 1    albuterol sulfate HFA (PROAIR HFA) 108 (90 Base) MCG/ACT inhaler, Inhale 2 puffs into the lungs every 6 hours as needed for Wheezing, Disp: 1 Inhaler, Rfl: 1    eletriptan (RELPAX) 40 MG tablet, Take 1 tablet by mouth once as needed (migraine) may repeat in 2 hours if necessary. Maximum of 2 doses in a 24 hour period. , Disp: 9 tablet, Rfl: 3    Multiple Vitamin (MULTIVITAMINS PO), Take by mouth, Disp: , Rfl:     Probiotic Product (PROBIOTIC DAILY PO), Take  by mouth., Disp: , Rfl:     Flaxseed, Linseed, (FLAX SEED OIL) 1000 MG CAPS, Take 1 capsule by mouth daily. , Disp: , Rfl:     Levofloxacin, Omnicef, Acyclovir and related, Erythromycin, Lipitor, Topamax, Diclofenac, and Penicillins    Vitals:    01/27/23 1303   BP: 136/72   Pulse: 56   Resp: 16   Temp: (!) 96.4 °F (35.8 °C)   TempSrc: Temporal   SpO2: 98%   Weight: 156 lb (70.8 kg)   Height: 5' 6\" (1.676 m)       Review of Systems   Constitutional:  Negative for appetite change, chills, diaphoresis, fatigue and fever. HENT:  Positive for congestion, nosebleeds (Not recently) and rhinorrhea. Negative for postnasal drip, sinus pressure, sneezing, sore throat, trouble swallowing and voice change. Eyes:  Negative for discharge, redness, itching and visual disturbance. Respiratory:  Positive for cough. Negative for apnea, choking, chest tightness, shortness of breath, wheezing and stridor. Cardiovascular:  Negative for chest pain, palpitations and leg swelling. Gastrointestinal:  Negative for abdominal distention, abdominal pain, blood in stool, constipation, diarrhea, nausea and vomiting. Endocrine: Negative for polyuria. Genitourinary:  Negative for decreased urine volume, difficulty urinating, dysuria, enuresis, frequency, hematuria and urgency. Musculoskeletal:  Negative for arthralgias, back pain, gait problem, joint swelling and myalgias. Skin:  Negative for rash. Allergic/Immunologic: Negative for environmental allergies and immunocompromised state. Neurological:  Negative for dizziness, tremors, seizures, weakness, light-headedness and headaches. Hematological:  Does not bruise/bleed easily. Psychiatric/Behavioral:  Positive for sleep disturbance. Negative for agitation, behavioral problems, confusion and hallucinations. All other systems reviewed and are negative. Physical Exam  Vitals reviewed. Constitutional:       General: She is not in acute distress. Appearance: She is well-developed. She is not ill-appearing, toxic-appearing or diaphoretic. Comments: Pleasant, averagely built   HENT:      Head: Normocephalic and atraumatic. Nose: Nose normal. No congestion or rhinorrhea. Mouth/Throat:      Mouth: Mucous membranes are moist.      Pharynx: Oropharynx is clear. No oropharyngeal exudate or posterior oropharyngeal erythema. Comments: Class III airway, dental repair  Eyes:      General: No scleral icterus. Right eye: No discharge. Left eye: No discharge. Conjunctiva/sclera: Conjunctivae normal.      Pupils: Pupils are equal, round, and reactive to light. Comments: Wearing glasses   Neck:      Thyroid: No thyromegaly. Vascular: No JVD. Trachea: No tracheal deviation. Cardiovascular:      Rate and Rhythm: Normal rate and regular rhythm. Heart sounds: Normal heart sounds. No murmur heard. No friction rub. No gallop. Pulmonary:      Effort: Pulmonary effort is normal. No respiratory distress. Breath sounds: Normal breath sounds. No stridor. No wheezing, rhonchi or rales. Musculoskeletal:      Right lower leg: No edema. Left lower leg: No edema. Lymphadenopathy:      Cervical: No cervical adenopathy. Skin:     General: Skin is warm and dry. Coloration: Skin is not jaundiced or pale. Findings: No bruising, erythema, lesion or rash. Neurological:      General: No focal deficit present. Mental Status: She is alert and oriented to person, place, and time.       Gait: Gait normal.      Comments: Detailed neurologic exam not performed   Psychiatric:         Mood and Affect: Mood normal.         Behavior: Behavior normal.

## 2023-01-27 ENCOUNTER — OFFICE VISIT (OUTPATIENT)
Dept: PULMONOLOGY | Age: 73
End: 2023-01-27
Payer: COMMERCIAL

## 2023-01-27 VITALS
HEART RATE: 56 BPM | WEIGHT: 156 LBS | RESPIRATION RATE: 16 BRPM | SYSTOLIC BLOOD PRESSURE: 136 MMHG | HEIGHT: 66 IN | BODY MASS INDEX: 25.07 KG/M2 | OXYGEN SATURATION: 98 % | TEMPERATURE: 96.4 F | DIASTOLIC BLOOD PRESSURE: 72 MMHG

## 2023-01-27 DIAGNOSIS — R49.0 HOARSENESS OF VOICE: ICD-10-CM

## 2023-01-27 DIAGNOSIS — Z86.16 HISTORY OF 2019 NOVEL CORONAVIRUS DISEASE (COVID-19): ICD-10-CM

## 2023-01-27 DIAGNOSIS — R05.3 CHRONIC COUGH: Primary | ICD-10-CM

## 2023-01-27 DIAGNOSIS — R06.02 SOB (SHORTNESS OF BREATH): ICD-10-CM

## 2023-01-27 PROCEDURE — 99213 OFFICE O/P EST LOW 20 MIN: CPT | Performed by: INTERNAL MEDICINE

## 2023-01-27 PROCEDURE — 1123F ACP DISCUSS/DSCN MKR DOCD: CPT | Performed by: INTERNAL MEDICINE

## 2023-01-27 RX ORDER — PROPRANOLOL HCL 60 MG
60 CAPSULE, EXTENDED RELEASE 24HR ORAL DAILY
COMMUNITY
Start: 2022-12-12

## 2023-01-27 RX ORDER — SIMVASTATIN 20 MG
20 TABLET ORAL EVERY EVENING
COMMUNITY
Start: 2023-01-25

## 2023-01-27 RX ORDER — LOSARTAN POTASSIUM 50 MG/1
50 TABLET ORAL DAILY
COMMUNITY
Start: 2023-01-10 | End: 2023-07-09

## 2023-01-27 NOTE — PATIENT INSTRUCTIONS
Remember to bring a list of pulmonary medications and any CPAP or BiPAP machines to your next appointment with the office. Please keep all of your future appointments scheduled by [facility]. Out of respect for other patients and providers, you may be asked to reschedule your appointment if you arrive later than your scheduled appointment time. Appointments cancelled less than 24hrs in advance will be considered a no show. Patients with three missed appointments within 1 year or four missed appointments within 2 years can be dismissed from the practice. Please be aware that our physicians are required to work in the Intensive Care Unit at [facility].  Your appointment may need to be rescheduled if they are designated to work during your appointment time. You may receive a survey regarding the care you received during your visit. Your input is valuable to us. We encourage you to complete and return your survey. We hope you will choose us in the future for your healthcare needs. Pt instructed of all future appointment dates & times, including radiology, labs, procedures & referrals. If procedures were scheduled preparation instructions provided. Instructions on future appointments with [facility] were given.

## 2023-01-29 RX ORDER — GABAPENTIN 100 MG/1
100 CAPSULE ORAL 2 TIMES DAILY
Qty: 180 CAPSULE | Refills: 1 | Status: SHIPPED | OUTPATIENT
Start: 2023-01-29 | End: 2023-07-28

## 2023-03-09 NOTE — PROGRESS NOTES
MA Communication:   The following orders are received by verbal communication from Arturo Coto MD    Orders include:  FU 6 mo No